# Patient Record
Sex: FEMALE | Race: WHITE | NOT HISPANIC OR LATINO | Employment: OTHER | URBAN - NONMETROPOLITAN AREA
[De-identification: names, ages, dates, MRNs, and addresses within clinical notes are randomized per-mention and may not be internally consistent; named-entity substitution may affect disease eponyms.]

---

## 2017-01-12 ENCOUNTER — HOSPITAL ENCOUNTER (OUTPATIENT)
Dept: ONCOLOGY | Facility: CLINIC | Age: 77
Discharge: HOME | End: 2017-01-12
Attending: INTERNAL MEDICINE | Admitting: INTERNAL MEDICINE

## 2017-11-30 ENCOUNTER — HOSPITAL ENCOUNTER (OUTPATIENT)
Dept: NUCLEAR MEDICINE | Facility: HOSPITAL | Age: 77
Discharge: HOME | End: 2017-11-30
Attending: FAMILY MEDICINE | Admitting: FAMILY MEDICINE

## 2018-07-11 ENCOUNTER — HOSPITAL ENCOUNTER (OUTPATIENT)
Dept: LAB | Facility: HOSPITAL | Age: 78
Setting detail: SPECIMEN
Discharge: HOME | End: 2018-07-11
Attending: INTERNAL MEDICINE | Admitting: INTERNAL MEDICINE

## 2018-07-12 LAB
ALBUMIN SERPL-MCNC: 4.2 G/DL (ref 3.5–4.8)
ALBUMIN/GLOB SERPL: 2 {RATIO} (ref 1–1.7)
ALP SERPL-CCNC: 76 IU/L (ref 32–91)
ALT SERPL-CCNC: 37 IU/L (ref 14–54)
ANION GAP SERPL CALC-SCNC: 11.3 MMOL/L (ref 10–20)
AST SERPL-CCNC: 42 IU/L (ref 15–41)
BILIRUB SERPL-MCNC: 0.3 MG/DL (ref 0.3–1.2)
BUN SERPL-MCNC: 7 MG/DL (ref 8–20)
BUN/CREAT SERPL: 8.8 (ref 5.4–26.2)
CALCIUM SERPL-MCNC: 8.7 MG/DL (ref 8.9–10.3)
CHLORIDE SERPL-SCNC: 102 MMOL/L (ref 101–111)
CONV CO2: 25 MMOL/L (ref 22–32)
CONV TOTAL PROTEIN: 6.3 G/DL (ref 6.1–7.9)
CREAT UR-MCNC: 0.8 MG/DL (ref 0.4–1)
GLOBULIN UR ELPH-MCNC: 2.1 G/DL (ref 2.5–3.8)
GLUCOSE SERPL-MCNC: 115 MG/DL (ref 65–99)
PHOSPHATE SERPL-MCNC: 3.7 MG/DL (ref 2.4–4.7)
POTASSIUM SERPL-SCNC: 4.3 MMOL/L (ref 3.6–5.1)
SODIUM SERPL-SCNC: 134 MMOL/L (ref 136–144)

## 2019-08-14 ENCOUNTER — TRANSCRIBE ORDERS (OUTPATIENT)
Dept: ADMINISTRATIVE | Facility: HOSPITAL | Age: 79
End: 2019-08-14

## 2019-08-14 ENCOUNTER — OFFICE (AMBULATORY)
Dept: URBAN - METROPOLITAN AREA PATHOLOGY 4 | Facility: PATHOLOGY | Age: 79
End: 2019-08-14

## 2019-08-14 ENCOUNTER — ON CAMPUS - OUTPATIENT (AMBULATORY)
Dept: URBAN - METROPOLITAN AREA HOSPITAL 2 | Facility: HOSPITAL | Age: 79
End: 2019-08-14

## 2019-08-14 ENCOUNTER — LAB REQUISITION (OUTPATIENT)
Dept: LAB | Facility: HOSPITAL | Age: 79
End: 2019-08-14

## 2019-08-14 VITALS
DIASTOLIC BLOOD PRESSURE: 89 MMHG | HEIGHT: 60 IN | DIASTOLIC BLOOD PRESSURE: 88 MMHG | HEART RATE: 80 BPM | RESPIRATION RATE: 15 BRPM | DIASTOLIC BLOOD PRESSURE: 72 MMHG | DIASTOLIC BLOOD PRESSURE: 99 MMHG | RESPIRATION RATE: 16 BRPM | DIASTOLIC BLOOD PRESSURE: 94 MMHG | DIASTOLIC BLOOD PRESSURE: 97 MMHG | SYSTOLIC BLOOD PRESSURE: 156 MMHG | SYSTOLIC BLOOD PRESSURE: 149 MMHG | HEART RATE: 72 BPM | SYSTOLIC BLOOD PRESSURE: 147 MMHG | HEART RATE: 75 BPM | HEART RATE: 70 BPM | DIASTOLIC BLOOD PRESSURE: 77 MMHG | OXYGEN SATURATION: 100 % | OXYGEN SATURATION: 95 % | SYSTOLIC BLOOD PRESSURE: 161 MMHG | SYSTOLIC BLOOD PRESSURE: 157 MMHG | DIASTOLIC BLOOD PRESSURE: 65 MMHG | DIASTOLIC BLOOD PRESSURE: 93 MMHG | OXYGEN SATURATION: 97 % | HEART RATE: 83 BPM | RESPIRATION RATE: 18 BRPM | DIASTOLIC BLOOD PRESSURE: 69 MMHG | HEART RATE: 76 BPM | HEART RATE: 79 BPM | SYSTOLIC BLOOD PRESSURE: 121 MMHG | SYSTOLIC BLOOD PRESSURE: 132 MMHG | HEART RATE: 85 BPM | TEMPERATURE: 97.6 F | OXYGEN SATURATION: 98 % | SYSTOLIC BLOOD PRESSURE: 119 MMHG | WEIGHT: 184 LBS | SYSTOLIC BLOOD PRESSURE: 112 MMHG | HEART RATE: 78 BPM | HEART RATE: 81 BPM | DIASTOLIC BLOOD PRESSURE: 78 MMHG | HEART RATE: 57 BPM | SYSTOLIC BLOOD PRESSURE: 144 MMHG

## 2019-08-14 DIAGNOSIS — C18.4 MALIGNANT NEOPLASM OF TRANSVERSE COLON: ICD-10-CM

## 2019-08-14 DIAGNOSIS — Z86.010 PERSONAL HISTORY OF COLONIC POLYPS: ICD-10-CM

## 2019-08-14 DIAGNOSIS — K56.600 PARTIAL INTESTINAL OBSTRUCTION, UNSPECIFIED AS TO CAUSE: ICD-10-CM

## 2019-08-14 DIAGNOSIS — K63.89 MASS OF COLON: Primary | ICD-10-CM

## 2019-08-14 DIAGNOSIS — D12.2 BENIGN NEOPLASM OF ASCENDING COLON: ICD-10-CM

## 2019-08-14 DIAGNOSIS — Z86.010 HISTORY OF COLONIC POLYPS: ICD-10-CM

## 2019-08-14 DIAGNOSIS — K57.30 DIVERTICULOSIS OF LARGE INTESTINE WITHOUT PERFORATION OR ABS: ICD-10-CM

## 2019-08-14 DIAGNOSIS — D12.0 BENIGN NEOPLASM OF CECUM: ICD-10-CM

## 2019-08-14 DIAGNOSIS — K64.0 FIRST DEGREE HEMORRHOIDS: ICD-10-CM

## 2019-08-14 DIAGNOSIS — Z98.890 OTHER SPECIFIED POSTPROCEDURAL STATES: ICD-10-CM

## 2019-08-14 DIAGNOSIS — D12.5 BENIGN NEOPLASM OF SIGMOID COLON: ICD-10-CM

## 2019-08-14 DIAGNOSIS — K22.2 ESOPHAGEAL OBSTRUCTION: ICD-10-CM

## 2019-08-14 DIAGNOSIS — K31.2 HOURGLASS STRICTURE AND STENOSIS OF STOMACH: ICD-10-CM

## 2019-08-14 DIAGNOSIS — K57.30 DIVERTICULOSIS OF LARGE INTESTINE WITHOUT PERFORATION OR ABSCESS WITHOUT BLEEDING: ICD-10-CM

## 2019-08-14 DIAGNOSIS — R13.10 DYSPHAGIA, UNSPECIFIED: ICD-10-CM

## 2019-08-14 DIAGNOSIS — R13.10 DYSPHAGIA: ICD-10-CM

## 2019-08-14 PROBLEM — K92.2 GASTROINTESTINAL HEMORRHAGE, UNSPECIFIED: Status: ACTIVE | Noted: 2019-08-14

## 2019-08-14 LAB
GI HISTOLOGY: A. SELECT: (no result)
GI HISTOLOGY: B. UNSPECIFIED: (no result)
GI HISTOLOGY: C. UNSPECIFIED: (no result)
GI HISTOLOGY: PDF REPORT: (no result)

## 2019-08-14 PROCEDURE — 88342 IMHCHEM/IMCYTCHM 1ST ANTB: CPT | Mod: 26 | Performed by: INTERNAL MEDICINE

## 2019-08-14 PROCEDURE — 88342 IMHCHEM/IMCYTCHM 1ST ANTB: CPT | Performed by: INTERNAL MEDICINE

## 2019-08-14 PROCEDURE — 43249 ESOPH EGD DILATION <30 MM: CPT | Performed by: INTERNAL MEDICINE

## 2019-08-14 PROCEDURE — 45385 COLONOSCOPY W/LESION REMOVAL: CPT | Mod: PT | Performed by: INTERNAL MEDICINE

## 2019-08-14 PROCEDURE — 45381 COLONOSCOPY SUBMUCOUS NJX: CPT | Mod: 59 | Performed by: INTERNAL MEDICINE

## 2019-08-14 PROCEDURE — 88342 IMHCHEM/IMCYTCHM 1ST ANTB: CPT

## 2019-08-14 PROCEDURE — 88305 TISSUE EXAM BY PATHOLOGIST: CPT | Performed by: INTERNAL MEDICINE

## 2019-08-14 PROCEDURE — 45380 COLONOSCOPY AND BIOPSY: CPT | Mod: 59,PT | Performed by: INTERNAL MEDICINE

## 2019-08-14 PROCEDURE — 88341 IMHCHEM/IMCYTCHM EA ADD ANTB: CPT

## 2019-08-14 PROCEDURE — 88341 IMHCHEM/IMCYTCHM EA ADD ANTB: CPT | Mod: 26 | Performed by: INTERNAL MEDICINE

## 2019-08-14 PROCEDURE — 88305 TISSUE EXAM BY PATHOLOGIST: CPT | Mod: 26 | Performed by: INTERNAL MEDICINE

## 2019-08-14 PROCEDURE — 43245 EGD DILATE STRICTURE: CPT | Performed by: INTERNAL MEDICINE

## 2019-08-19 ENCOUNTER — HOSPITAL ENCOUNTER (OUTPATIENT)
Dept: CT IMAGING | Facility: HOSPITAL | Age: 79
Discharge: HOME | End: 2019-08-19
Admitting: INTERNAL MEDICINE

## 2019-08-19 ENCOUNTER — HOSPITAL ENCOUNTER (OUTPATIENT)
Dept: CT IMAGING | Facility: HOSPITAL | Age: 79
Discharge: HOME | End: 2019-08-19

## 2019-08-19 DIAGNOSIS — K63.89 MASS OF COLON: ICD-10-CM

## 2019-08-19 LAB — CREAT BLDA-MCNC: 0.8 MG/DL (ref 0.6–1.3)

## 2019-08-19 PROCEDURE — 82565 ASSAY OF CREATININE: CPT

## 2019-08-19 PROCEDURE — 0 IOPAMIDOL PER 1 ML: Performed by: INTERNAL MEDICINE

## 2019-08-19 PROCEDURE — 74177 CT ABD & PELVIS W/CONTRAST: CPT

## 2019-08-19 PROCEDURE — 71260 CT THORAX DX C+: CPT

## 2019-08-19 RX ADMIN — IOPAMIDOL 100 ML: 755 INJECTION, SOLUTION INTRAVENOUS at 13:30

## 2019-08-20 ENCOUNTER — OFFICE VISIT (OUTPATIENT)
Dept: SURGERY | Facility: CLINIC | Age: 79
End: 2019-08-20

## 2019-08-20 VITALS
DIASTOLIC BLOOD PRESSURE: 93 MMHG | TEMPERATURE: 98.1 F | HEART RATE: 77 BPM | OXYGEN SATURATION: 96 % | SYSTOLIC BLOOD PRESSURE: 156 MMHG | WEIGHT: 189.8 LBS | HEIGHT: 60 IN | BODY MASS INDEX: 37.26 KG/M2

## 2019-08-20 DIAGNOSIS — C18.4 MALIGNANT NEOPLASM OF TRANSVERSE COLON (HCC): Primary | ICD-10-CM

## 2019-08-20 PROBLEM — IMO0002 DEGENERATION OF INTERVERTEBRAL DISC: Status: ACTIVE | Noted: 2019-08-20

## 2019-08-20 PROBLEM — M81.0 OSTEOPOROSIS: Status: ACTIVE | Noted: 2019-08-20

## 2019-08-20 PROBLEM — E03.9 HYPOTHYROIDISM: Status: ACTIVE | Noted: 2019-08-20

## 2019-08-20 PROBLEM — Z83.3 FAMILY HISTORY OF DIABETES MELLITUS: Status: ACTIVE | Noted: 2019-08-20

## 2019-08-20 PROBLEM — K21.9 GASTROESOPHAGEAL REFLUX DISEASE: Status: ACTIVE | Noted: 2019-08-20

## 2019-08-20 PROBLEM — F32.A DEPRESSION: Status: ACTIVE | Noted: 2019-08-20

## 2019-08-20 PROBLEM — Z80.0 FAMILY HISTORY OF MALIGNANT NEOPLASM OF COLON: Status: ACTIVE | Noted: 2019-08-20

## 2019-08-20 LAB
LAB AP CASE REPORT: NORMAL
LAB AP DIAGNOSIS COMMENT: NORMAL
PATH REPORT.ADDENDUM SPEC: NORMAL
PATH REPORT.FINAL DX SPEC: NORMAL
PATH REPORT.GROSS SPEC: NORMAL

## 2019-08-20 PROCEDURE — 99204 OFFICE O/P NEW MOD 45 MIN: CPT | Performed by: SURGERY

## 2019-08-20 RX ORDER — MECLIZINE HYDROCHLORIDE 25 MG/1
25 TABLET ORAL 3 TIMES DAILY PRN
COMMUNITY

## 2019-08-20 RX ORDER — ACETAMINOPHEN 500 MG
TABLET ORAL
COMMUNITY
Start: 2012-11-12

## 2019-08-20 RX ORDER — HYDROCODONE BITARTRATE AND ACETAMINOPHEN 10; 325 MG/1; MG/1
1 TABLET ORAL 4 TIMES DAILY
Refills: 0 | COMMUNITY
Start: 2019-08-03

## 2019-08-20 RX ORDER — HYDROXYCHLOROQUINE SULFATE 200 MG/1
TABLET, FILM COATED ORAL
Refills: 0 | COMMUNITY
Start: 2019-06-28

## 2019-08-20 RX ORDER — MEMANTINE HYDROCHLORIDE 10 MG/1
10 TABLET ORAL 2 TIMES DAILY
Refills: 3 | COMMUNITY
Start: 2019-06-23 | End: 2021-06-16 | Stop reason: SDUPTHER

## 2019-08-20 RX ORDER — RISPERIDONE 0.5 MG/1
0.05 TABLET ORAL DAILY
Refills: 0 | COMMUNITY
Start: 2019-06-26

## 2019-08-20 RX ORDER — ALPRAZOLAM 2 MG/1
2 TABLET ORAL 2 TIMES DAILY PRN
Refills: 0 | COMMUNITY
Start: 2019-08-09

## 2019-08-20 RX ORDER — CLOPIDOGREL BISULFATE 75 MG/1
75 TABLET ORAL DAILY
Refills: 0 | COMMUNITY
Start: 2019-08-01

## 2019-08-20 RX ORDER — CYANOCOBALAMIN 1000 UG/ML
INJECTION, SOLUTION INTRAMUSCULAR; SUBCUTANEOUS
COMMUNITY
Start: 2018-07-11 | End: 2021-06-16 | Stop reason: SDUPTHER

## 2019-08-20 RX ORDER — PANTOPRAZOLE SODIUM 40 MG/1
40 TABLET, DELAYED RELEASE ORAL DAILY
Refills: 0 | COMMUNITY
Start: 2019-08-01

## 2019-08-20 RX ORDER — LEVOTHYROXINE SODIUM 0.1 MG/1
100 TABLET ORAL DAILY
Refills: 0 | COMMUNITY
Start: 2019-08-01

## 2019-08-20 RX ORDER — LOSARTAN POTASSIUM 25 MG/1
25 TABLET ORAL DAILY
Refills: 0 | COMMUNITY
Start: 2019-06-07

## 2019-08-20 RX ORDER — DULOXETIN HYDROCHLORIDE 60 MG/1
30 CAPSULE, DELAYED RELEASE ORAL
Refills: 0 | COMMUNITY
Start: 2019-08-09 | End: 2020-01-22

## 2019-08-20 NOTE — PROGRESS NOTES
"Zaki Sierra is a 78 y.o. female.   Chief Complaint   Patient presents with   • Colon Cancer     /93 (BP Location: Left arm, Patient Position: Sitting, Cuff Size: Adult)   Pulse 77   Temp 98.1 °F (36.7 °C) (Oral)   Ht 152.4 cm (60\")   Wt 86.1 kg (189 lb 12.8 oz)   SpO2 96%   BMI 37.07 kg/m²     HISTORY OF PRESENT ILLNESS:  78-year-old lady who was referred to me today for evaluation for surgical resection for a newly diagnosed transverse colon cancer.  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  During her most recent colonoscopy she was found to have a partially obstructing transverse colon cancer of approximately 2 cm in size this was biopsied and the pathology did confirm invasive moderately differentiated adenocarcinoma with focal necrosis.  She underwent CT scanning of her chest abdomen and pelvis with no definitive evidence of metastatic disease.  She denies any new worsening issues of dysphasia or severe constipation or diarrhea even though she deals with all 3 of the symptoms.  She has mild to moderate upper abdominal swelling this is associated with some pain that gradually resolved spontaneously.  She has a history of multiple transient ischemic attacks and is currently managed on antiplatelet therapy.  Her Randolph-en-Y gastric bypass was approximately 40 years ago.  She has had multiple other abdominal surgeries including hysterectomy, cholecystectomy and thinks she may have had to have a partial colectomy at the time of her Randolph-en-Y gastric bypass due to a complication.  She endorses some blood per rectum with significant straining.  She does have a family history of colon cancer including and her brother who ultimately had to undergo surgery requiring stoma and " chemotherapy and says that she witnessed him have a significant worsening of his quality of life as soon as he started therapy for his colon cancer.  She is in the office today with her 2 daughters.       Outpatient Encounter Medications as of 8/20/2019   Medication Sig Dispense Refill   • ALPRAZolam (XANAX) 2 MG tablet Take 2 mg by mouth 2 (Two) Times a Day As Needed. for anxiety  0   • Calcium Carbonate-Vit D-Min (CALCIUM 1200) 1589-6755 MG-UNIT chewable tablet CALCIUM 1200 4285-0218 MG-UNIT CHEW     • clopidogrel (PLAVIX) 75 MG tablet Take 75 mg by mouth Daily.  0   • cyanocobalamin 1000 MCG/ML injection CYANOCOBALAMIN 1000 MCG/ML SOLN     • denosumab (PROLIA) 60 MG/ML solution prefilled syringe syringe PROLIA 60 MG/ML SOSY     • DULoxetine (CYMBALTA) 60 MG capsule Take 60 mg by mouth every night at bedtime.  0   • HYDROcodone-acetaminophen (NORCO)  MG per tablet Take 1 tablet by mouth 4 (Four) Times a Day.  0   • hydroxychloroquine (PLAQUENIL) 200 MG tablet TAKE ONE TABLET BY MOUTH EVERY DAY with food OR MILK  0   • levothyroxine (SYNTHROID, LEVOTHROID) 100 MCG tablet Take 100 mcg by mouth Daily.  0   • losartan (COZAAR) 25 MG tablet Take 25 mg by mouth Daily.  0   • meclizine (ANTIVERT) 25 MG tablet Take 25 mg by mouth 3 (Three) Times a Day As Needed for dizziness.     • memantine (NAMENDA) 10 MG tablet Take 10 mg by mouth 2 (Two) Times a Day.  3   • pantoprazole (PROTONIX) 40 MG EC tablet Take 40 mg by mouth Daily.  0   • risperiDONE (risperDAL) 0.5 MG tablet Take 0.5 mg by mouth Daily.  0     Facility-Administered Encounter Medications as of 8/20/2019   Medication Dose Route Frequency Provider Last Rate Last Dose   • [COMPLETED] iopamidol (ISOVUE-370) 76 % injection 100 mL  100 mL Intravenous Once in imaging Rupa Noland MD   100 mL at 08/19/19 1330         The following portions of the patient's history were reviewed and updated as appropriate: allergies, current medications, past family history,  past medical history, past social history, past surgical history and problem list.    Review of Systems  A complete review of system has been obtained is positive for chills depression dizziness fever forgetfulness headache loss of sleep nervousness numbness sweats musculoskeletal pain frequent urination bloating constipation diarrhea excessive thirst gas hemorrhoids indigestion nausea stomach pain vomiting chest pain high blood pressure poor circulation short of breath ankle swelling blurry vision difficulty swallowing dry mouth earache hoarseness loss of hearing persistent cough sinus problems vision changes easy bruising itching wounds that will not heal and hot flashes.  The remainder of the review of systems is negative    Objective   Physical Exam   Constitutional: She appears well-developed and well-nourished. No distress.   Eyes: EOM are normal. Pupils are equal, round, and reactive to light.   Neck: Neck supple. No tracheal deviation present.   Cardiovascular: Normal rate and regular rhythm.   Pulmonary/Chest: Effort normal. No respiratory distress.   Abdominal: Soft. She exhibits no mass. There is no tenderness.   Abdomen is obese with multiple surgical incisions including a midline laparotomy in both the upper and lower abdomen and a right subcostal incision.  There is no definitive evidence of hernia.  There is no chronic wounds on the abdomen.   Musculoskeletal: She exhibits edema. She exhibits no tenderness.   Neurological: She is alert. No cranial nerve deficit.   Skin: Skin is warm and dry. She is not diaphoretic.   Psychiatric: She has a normal mood and affect. Her behavior is normal.         Assessment/Plan   Willa was seen today for colon cancer.    Diagnoses and all orders for this visit:    Malignant neoplasm of transverse colon (CMS/HCC)  -     CEA; Future  -     Ambulatory Referral to Oncology    I have counseled the patient about her diagnosis of a 2 cm colon cancer with no evidence of local  invasion or metastatic disease on her CT scans.  I have talked to her about the risks and benefits of surgical removal of this transverse colon cancer including the fact that her Randolph-en-Y gastric bypass may have to be revised at the time of the operation since the limb does pass directly adjacent to the middle colic vasculature.  I counseled her about the expected recovery and since she does have some fair comorbidity including debilitating arthritis she has a lot of hesitancy about what her quality of life would be if she does not recover well.  She also has seen her brother go through treatment for colon cancer and says that she would rather be dead than live with a colostomy.  When I try to  her more about this she asked if I heard what she said that she meant it.  During our discussion it became clear that she did not want to pursue surgical therapy for management of her colon cancer.  She discussed this with her daughters today in the office in front of me who all were in agreement that they did not want surgery.  I then offered to refer her to oncology for discussion about whether or not she could have or would benefit from chemotherapy.  I also briefly mentioned that if no therapy is performed that this likely would progress but nobody would know how much time she would have.  I briefly talked about the possibility that she could develop a colon obstruction which at this location may be able to be stented but may not.  I have also told her that if that were to happen or if she were to get much sicker that she could consider hospice care.  After this discussion she was given an opportunity to ask any additional questions about surgical management of this problem she did not have any and was very comfortable with her decision.  At this point I will see her as needed for any future counseling or any surgical intervention that she may need and/or allow.    Man Tim MD  8/20/2019  9:50 AM

## 2019-08-23 ENCOUNTER — TELEPHONE (OUTPATIENT)
Dept: ONCOLOGY | Facility: CLINIC | Age: 79
End: 2019-08-23

## 2019-08-23 NOTE — TELEPHONE ENCOUNTER
Ms. Clark's daughter, Violeta, left message to see if appt time could be changed as have appt with surgeon at same time.  Returned call, rescheduled to 2:45.

## 2019-08-26 ENCOUNTER — APPOINTMENT (OUTPATIENT)
Dept: LAB | Facility: HOSPITAL | Age: 79
End: 2019-08-26

## 2019-08-26 ENCOUNTER — CONSULT (OUTPATIENT)
Dept: ONCOLOGY | Facility: CLINIC | Age: 79
End: 2019-08-26

## 2019-08-26 ENCOUNTER — RESULTS ENCOUNTER (OUTPATIENT)
Dept: ONCOLOGY | Facility: CLINIC | Age: 79
End: 2019-08-26

## 2019-08-26 VITALS
WEIGHT: 185.4 LBS | TEMPERATURE: 98 F | DIASTOLIC BLOOD PRESSURE: 90 MMHG | HEIGHT: 60 IN | HEART RATE: 79 BPM | SYSTOLIC BLOOD PRESSURE: 148 MMHG | BODY MASS INDEX: 36.4 KG/M2 | RESPIRATION RATE: 20 BRPM

## 2019-08-26 DIAGNOSIS — C18.4 MALIGNANT NEOPLASM OF TRANSVERSE COLON (HCC): ICD-10-CM

## 2019-08-26 DIAGNOSIS — C18.4 MALIGNANT NEOPLASM OF TRANSVERSE COLON (HCC): Primary | ICD-10-CM

## 2019-08-26 LAB
ALBUMIN SERPL-MCNC: 3.7 G/DL (ref 3.5–4.8)
ALBUMIN/GLOB SERPL: 1.6 G/DL (ref 1–1.7)
ALP SERPL-CCNC: 59 U/L (ref 32–91)
ALT SERPL W P-5'-P-CCNC: 14 U/L (ref 14–54)
ANION GAP SERPL CALCULATED.3IONS-SCNC: 14.9 MMOL/L (ref 5–15)
AST SERPL-CCNC: 19 U/L (ref 15–41)
BASOPHILS # BLD AUTO: 0.07 10*3/MM3 (ref 0–0.2)
BASOPHILS NFR BLD AUTO: 0.8 % (ref 0–1.5)
BILIRUB SERPL-MCNC: 0.3 MG/DL (ref 0.3–1.2)
BUN BLD-MCNC: 9 MG/DL (ref 8–20)
BUN/CREAT SERPL: 11.3 (ref 5.4–26.2)
CALCIUM SPEC-SCNC: 8.7 MG/DL (ref 8.9–10.3)
CHLORIDE SERPL-SCNC: 102 MMOL/L (ref 101–111)
CO2 SERPL-SCNC: 23 MMOL/L (ref 22–32)
CREAT BLD-MCNC: 0.8 MG/DL (ref 0.4–1)
DEPRECATED RDW RBC AUTO: 43.1 FL (ref 37–54)
EOSINOPHIL # BLD AUTO: 0.15 10*3/MM3 (ref 0–0.4)
EOSINOPHIL NFR BLD AUTO: 1.7 % (ref 0.3–6.2)
ERYTHROCYTE [DISTWIDTH] IN BLOOD BY AUTOMATED COUNT: 13.2 % (ref 12.3–15.4)
GFR SERPL CREATININE-BSD FRML MDRD: 70 ML/MIN/1.73
GLOBULIN UR ELPH-MCNC: 2.3 GM/DL (ref 2.5–3.8)
GLUCOSE BLD-MCNC: 153 MG/DL (ref 65–99)
HCT VFR BLD AUTO: 36.4 % (ref 34–46.6)
HGB BLD-MCNC: 11.7 G/DL (ref 12–15.9)
LYMPHOCYTES # BLD AUTO: 2.17 10*3/MM3 (ref 0.7–3.1)
LYMPHOCYTES NFR BLD AUTO: 24.5 % (ref 19.6–45.3)
MCH RBC QN AUTO: 29.6 PG (ref 26.6–33)
MCHC RBC AUTO-ENTMCNC: 32.1 G/DL (ref 31.5–35.7)
MCV RBC AUTO: 92.2 FL (ref 79–97)
MONOCYTES # BLD AUTO: 0.78 10*3/MM3 (ref 0.1–0.9)
MONOCYTES NFR BLD AUTO: 8.8 % (ref 5–12)
NEUTROPHILS # BLD AUTO: 5.68 10*3/MM3 (ref 1.7–7)
NEUTROPHILS NFR BLD AUTO: 64.2 % (ref 42.7–76)
PLATELET # BLD AUTO: 242 10*3/MM3 (ref 140–450)
PMV BLD AUTO: 10.8 FL (ref 6–12)
POTASSIUM BLD-SCNC: 4.9 MMOL/L (ref 3.6–5.1)
PROT SERPL-MCNC: 6 G/DL (ref 6.1–7.9)
RBC # BLD AUTO: 3.95 10*6/MM3 (ref 3.77–5.28)
SODIUM BLD-SCNC: 135 MMOL/L (ref 136–144)
WBC NRBC COR # BLD: 8.85 10*3/MM3 (ref 3.4–10.8)

## 2019-08-26 PROCEDURE — 99215 OFFICE O/P EST HI 40 MIN: CPT | Performed by: INTERNAL MEDICINE

## 2019-08-26 PROCEDURE — 85025 COMPLETE CBC W/AUTO DIFF WBC: CPT | Performed by: INTERNAL MEDICINE

## 2019-08-26 PROCEDURE — 36415 COLL VENOUS BLD VENIPUNCTURE: CPT | Performed by: INTERNAL MEDICINE

## 2019-08-26 PROCEDURE — 80053 COMPREHEN METABOLIC PANEL: CPT | Performed by: NURSE PRACTITIONER

## 2019-08-26 RX ORDER — LANOLIN ALCOHOL/MO/W.PET/CERES
100 CREAM (GRAM) TOPICAL DAILY
COMMUNITY

## 2019-08-26 RX ORDER — LANOLIN ALCOHOL/MO/W.PET/CERES
400 CREAM (GRAM) TOPICAL DAILY
COMMUNITY

## 2019-08-26 NOTE — PROGRESS NOTES
Hematology/Oncology Outpatient Consultation    Willa Sierra  1940    Primary Care Physician: Marilin Ernst MD  Referring Physician: Marilin Ernst,*  Reason For Consult: Adenocarcinoma of colon    History of Present Illness:  Mrs. Willa Sierra is status post gastric bypass surgery at age 38 and has chronic history of iron deficiency anemia requiring parenteral iron infusion and she is on monthly B12 injections.  Patient reports she undergoes regular screening colonoscopies this time it was prolonged by 2 years secondary to illness in the family and passing away of her .  · She had a colonoscopy done by Dr. Noland on 8/19/2019, it revealed polyps in the cecum and ascending colon which were benign.  There was a mass of 2 cm in size in the mid transverse colon.  A 4 mm polyp in the sigmoid colon which was benign.  There was mild diverticulosis of the descending colon and sigmoid colon.  Grade/stage I internal and external hemorrhoids.  · Pathology report was polyps in the cecum, sigmoid colon with tubular adenoma.  Transverse colon mass was invasive moderately differentiated adenocarcinoma with focal necrosis.   MLH1: Intact nuclear expression   MLH2: Intact nuclear expression   MSH6: Intact nuclear expression   PMS2: Intact nuclear expression  IHC interpretation-no loss of nuclear expression of MMR proteins: Low probability of microsatellite instability- high(MSI-H)  Patient was sent to the cancer care center for her newly diagnosed colon carcinoma and seen by me on 8/26/2019.    Patient was seen by me in the past for iron deficiency anemia and was given parenteral iron infusions in 2015.  · 8/14/2019 CT scan of the chest abdomen pelvis  1. No colonic mass lesion is identified on this CT examination. There is  no CT correlate to the patient's described transverse colonic mass, seen  on colonoscopy.  2. There is no evidence of metastatic disease within the chest, abdomen  or pelvis.  3.  Minimal patchy groundglass density in the right upper lobe favored to  represent benign mild pneumonitis or scarring.  4. Surgical changes of the EG junction stomach, in keeping with history  of gastric bypass.  5. Additional CT findings include: Cholecystectomy, presumed  hysterectomy, hepatic cyst, left renal cysts, chronic appearing mild  compression fractures of T1 and T2, mild coronary artery calcifications.       Past Medical History:   Diagnosis Date   • Anemia    • Arthritis    • Cataract    • Depression    • Osteoporosis    • Thyroid disease    • TIA (transient ischemic attack)        Past Surgical History:   Procedure Laterality Date   • CHOLECYSTECTOMY     • COLON RESECTION     • FACIAL COSMETIC SURGERY     • GASTRIC BYPASS     • HEMORRHOIDECTOMY     • HYSTERECTOMY           Current Outpatient Medications:   •  ALPRAZolam (XANAX) 2 MG tablet, Take 2 mg by mouth 2 (Two) Times a Day As Needed. for anxiety, Disp: , Rfl: 0  •  Calcium Carbonate-Vit D-Min (CALCIUM 1200) 8702-7164 MG-UNIT chewable tablet, CALCIUM 1200 0393-6264 MG-UNIT CHEW, Disp: , Rfl:   •  CBD (cannabidiol) oral oil, Take  by mouth As Needed., Disp: , Rfl:   •  clopidogrel (PLAVIX) 75 MG tablet, Take 75 mg by mouth Daily., Disp: , Rfl: 0  •  cyanocobalamin 1000 MCG/ML injection, CYANOCOBALAMIN 1000 MCG/ML SOLN, Disp: , Rfl:   •  denosumab (PROLIA) 60 MG/ML solution prefilled syringe syringe, PROLIA 60 MG/ML SOSY, Disp: , Rfl:   •  DULoxetine (CYMBALTA) 60 MG capsule, Take 60 mg by mouth every night at bedtime., Disp: , Rfl: 0  •  folic acid (FOLVITE) 400 MCG tablet, Take 400 mcg by mouth Daily., Disp: , Rfl:   •  HYDROcodone-acetaminophen (NORCO)  MG per tablet, Take 1 tablet by mouth 4 (Four) Times a Day., Disp: , Rfl: 0  •  hydroxychloroquine (PLAQUENIL) 200 MG tablet, TAKE ONE TABLET BY MOUTH EVERY DAY with food OR MILK, Disp: , Rfl: 0  •  levothyroxine (SYNTHROID, LEVOTHROID) 100 MCG tablet, Take 100 mcg by mouth Daily., Disp: ,  Rfl: 0  •  losartan (COZAAR) 25 MG tablet, Take 25 mg by mouth Daily., Disp: , Rfl: 0  •  Magnesium 100 MG capsule, Take 165 mg by mouth Daily., Disp: , Rfl:   •  meclizine (ANTIVERT) 25 MG tablet, Take 25 mg by mouth 3 (Three) Times a Day As Needed for dizziness., Disp: , Rfl:   •  memantine (NAMENDA) 10 MG tablet, Take 10 mg by mouth 2 (Two) Times a Day., Disp: , Rfl: 3  •  pantoprazole (PROTONIX) 40 MG EC tablet, Take 40 mg by mouth Daily., Disp: , Rfl: 0  •  risperiDONE (risperDAL) 0.5 MG tablet, Take 0.5 mg by mouth Daily., Disp: , Rfl: 0  •  vitamin B-6 (PYRIDOXINE) 50 MG tablet, Take 100 mg by mouth Daily., Disp: , Rfl:     No Known Allergies      There is no immunization history on file for this patient.    Family History   Problem Relation Age of Onset   • Heart disease Mother    • Diabetes Mother    • Heart disease Father    • Diabetes Father    • Stroke Father    • Heart disease Sister    • Liver disease Sister    • Cancer Brother    • Diabetes Brother    • Heart disease Brother        Cancer-related family history includes Cancer in her brother.    Social History     Tobacco Use   • Smoking status: Never Smoker   • Smokeless tobacco: Never Used   Substance Use Topics   • Alcohol use: No     Frequency: Never   • Drug use: No     Subjective: Patient is in my office accompanied by her family.  Reports to severe fatigue and tired which has been going on for about a year.  Her  passed away from suicide earlier this year.  He was very sick prior to that requiring 24 hours health care.  Patient denies any visible blood loss denies any constipation or diarrhea.  No fevers night sweats weight loss    ROS:   Review of Systems   Constitutional: Negative for fever.   HENT: Negative for nosebleeds and trouble swallowing.    Eyes: Negative for visual disturbance.   Respiratory: Negative for cough, shortness of breath and wheezing.    Cardiovascular: Negative for chest pain.   Gastrointestinal: Negative for  "abdominal pain and blood in stool.   Endocrine: Negative for cold intolerance.   Genitourinary: Negative for dysuria and hematuria.   Musculoskeletal: Negative for joint swelling.   Skin: Negative for rash.   Allergic/Immunologic: Negative for environmental allergies.   Neurological: Negative for seizures.   Hematological: Does not bruise/bleed easily.   Psychiatric/Behavioral: The patient is not nervous/anxious.        Objective:    Vitals:    08/26/19 1459   BP: 148/90   Pulse: 79   Resp: 20   Temp: 98 °F (36.7 °C)   Weight: 84.1 kg (185 lb 6.4 oz)   Height: 152.4 cm (60\")   PainSc: 0-No pain           Physical Exam:    Physical Exam   Constitutional: She is oriented to person, place, and time. No distress.   Moderately built obese   HENT:   Head: Normocephalic and atraumatic.   Eyes: Conjunctivae and EOM are normal. Right eye exhibits no discharge. Left eye exhibits no discharge. No scleral icterus.   Neck: Normal range of motion. Neck supple. No thyromegaly present.   Cardiovascular: Normal rate, regular rhythm and normal heart sounds. Exam reveals no gallop and no friction rub.   Pulmonary/Chest: Effort normal. No stridor. No respiratory distress. She has no wheezes.   Abdominal: Soft. Bowel sounds are normal. She exhibits no mass. There is no tenderness. There is no rebound and no guarding.   Musculoskeletal: Normal range of motion. She exhibits no tenderness.   Lymphadenopathy:     She has no cervical adenopathy.   Neurological: She is alert and oriented to person, place, and time. She exhibits normal muscle tone.   Skin: Skin is warm. No rash noted. She is not diaphoretic. No erythema.   Psychiatric: She has a normal mood and affect. Her behavior is normal.   Nursing note and vitals reviewed.      RECENT LABS  WBC   Date Value Ref Range Status   08/26/2019 8.85 3.40 - 10.80 10*3/mm3 Final     RBC   Date Value Ref Range Status   08/26/2019 3.95 3.77 - 5.28 10*6/mm3 Final     Hemoglobin   Date Value Ref Range " Status   08/26/2019 11.7 (L) 12.0 - 15.9 g/dL Final     Hematocrit   Date Value Ref Range Status   08/26/2019 36.4 34.0 - 46.6 % Final     MCV   Date Value Ref Range Status   08/26/2019 92.2 79.0 - 97.0 fL Final     MCH   Date Value Ref Range Status   08/26/2019 29.6 26.6 - 33.0 pg Final     MCHC   Date Value Ref Range Status   08/26/2019 32.1 31.5 - 35.7 g/dL Final     RDW   Date Value Ref Range Status   08/26/2019 13.2 12.3 - 15.4 % Final     RDW-SD   Date Value Ref Range Status   08/26/2019 43.1 37.0 - 54.0 fl Final     MPV   Date Value Ref Range Status   08/26/2019 10.8 6.0 - 12.0 fL Final     Platelets   Date Value Ref Range Status   08/26/2019 242 140 - 450 10*3/mm3 Final     Neutrophil %   Date Value Ref Range Status   08/26/2019 64.2 42.7 - 76.0 % Final     Lymphocyte %   Date Value Ref Range Status   08/26/2019 24.5 19.6 - 45.3 % Final     Monocyte %   Date Value Ref Range Status   08/26/2019 8.8 5.0 - 12.0 % Final     Eosinophil %   Date Value Ref Range Status   08/26/2019 1.7 0.3 - 6.2 % Final     Basophil %   Date Value Ref Range Status   08/26/2019 0.8 0.0 - 1.5 % Final     Neutrophils, Absolute   Date Value Ref Range Status   08/26/2019 5.68 1.70 - 7.00 10*3/mm3 Final     Lymphocytes, Absolute   Date Value Ref Range Status   08/26/2019 2.17 0.70 - 3.10 10*3/mm3 Final     Monocytes, Absolute   Date Value Ref Range Status   08/26/2019 0.78 0.10 - 0.90 10*3/mm3 Final     Eosinophils, Absolute   Date Value Ref Range Status   08/26/2019 0.15 0.00 - 0.40 10*3/mm3 Final     Basophils, Absolute   Date Value Ref Range Status   08/26/2019 0.07 0.00 - 0.20 10*3/mm3 Final       Lab Results   Component Value Date    GLUCOSE 115 (H) 07/11/2018    BUN 7 (L) 07/11/2018    CREATININE 0.80 08/19/2019    BCR 8.8 07/11/2018    K 4.3 07/11/2018    CO2 25 07/11/2018    CALCIUM 8.7 (L) 07/11/2018    ALBUMIN 4.2 07/11/2018    LABIL2 2.0 (H) 07/11/2018    AST 42 (H) 07/11/2018    ALT 37 07/11/2018          Assessment    1. Adenocarcinoma in the mid transverse colon  2. History of iron deficiency anemia and B12 deficiency  3. Status post gastric bypass surgery at age 38  4. Fatigue and tiredness  5. ECOG 1    Plan:    1. I reviewed the pathology report with the patient.  Patient has invasive adenocarcinoma the mass has to be surgically removed.  Patient saw Dr. Bowman today she was told the risk of colostomy is about 1 and thousand.  I encouraged the patient to undergo surgery given the invasive adenocarcinoma.  I answered all her and family's questions to their satisfaction.  Staging CT scans and serum CEA were normal.  No evidence of metastatic disease.  2. She has mild anemia normocytic and normochromic.  I will obtain iron studies.  Continue vitamin B12 replacements once every 4 weeks  3. She is status post gastric bypass surgery no clinical evidence of nutrient deficiency  4. Fatigue and tiredness could be related to physical and mental stressors that she went through in the last 2 years.  Now with a new diagnosis of colon cancer also.  I will check TSH level.  5. I will see her back in my office in 2 months time by the time she would have had her surgery      I have reviewed labs results, imaging, vitals, and medications with the patient and her family today.      Patient and her family verbalized understanding and is in agreement of the above plan.        This report was compiled using Dragon voice recognition software. I have made every effort to proof read this document; however, typographical errors may persist.

## 2019-10-30 ENCOUNTER — TREATMENT (OUTPATIENT)
Dept: ONCOLOGY | Facility: CLINIC | Age: 79
End: 2019-10-30

## 2019-10-31 ENCOUNTER — OFFICE VISIT (OUTPATIENT)
Dept: ONCOLOGY | Facility: CLINIC | Age: 79
End: 2019-10-31

## 2019-10-31 VITALS
SYSTOLIC BLOOD PRESSURE: 163 MMHG | BODY MASS INDEX: 35.93 KG/M2 | DIASTOLIC BLOOD PRESSURE: 86 MMHG | HEIGHT: 60 IN | HEART RATE: 67 BPM | WEIGHT: 183 LBS | RESPIRATION RATE: 20 BRPM | TEMPERATURE: 98.5 F

## 2019-10-31 DIAGNOSIS — D50.0 IRON DEFICIENCY ANEMIA DUE TO CHRONIC BLOOD LOSS: ICD-10-CM

## 2019-10-31 DIAGNOSIS — C18.4 MALIGNANT NEOPLASM OF TRANSVERSE COLON (HCC): Primary | ICD-10-CM

## 2019-10-31 LAB
ALBUMIN SERPL-MCNC: 4 G/DL (ref 3.5–5.2)
ALBUMIN/GLOB SERPL: 1.7 G/DL
ALP SERPL-CCNC: 77 U/L (ref 39–117)
ALT SERPL W P-5'-P-CCNC: 16 U/L (ref 1–33)
ANION GAP SERPL CALCULATED.3IONS-SCNC: 12 MMOL/L (ref 5–15)
AST SERPL-CCNC: 26 U/L (ref 1–32)
BASOPHILS # BLD AUTO: 0.1 10*3/MM3 (ref 0–0.2)
BASOPHILS NFR BLD AUTO: 1.1 % (ref 0–1.5)
BILIRUB SERPL-MCNC: 0.2 MG/DL (ref 0.2–1.2)
BUN BLD-MCNC: 11 MG/DL (ref 8–23)
BUN/CREAT SERPL: 12 (ref 7–25)
CALCIUM SPEC-SCNC: 9.1 MG/DL (ref 8.6–10.5)
CHLORIDE SERPL-SCNC: 97 MMOL/L (ref 98–107)
CO2 SERPL-SCNC: 27 MMOL/L (ref 22–29)
CREAT BLD-MCNC: 0.92 MG/DL (ref 0.57–1)
DEPRECATED RDW RBC AUTO: 41.1 FL (ref 37–54)
EOSINOPHIL # BLD AUTO: 0 10*3/MM3 (ref 0–0.4)
EOSINOPHIL NFR BLD AUTO: 0.2 % (ref 0.3–6.2)
ERYTHROCYTE [DISTWIDTH] IN BLOOD BY AUTOMATED COUNT: 13.6 % (ref 12.3–15.4)
FERRITIN SERPL-MCNC: 28.58 NG/ML (ref 13–150)
GFR SERPL CREATININE-BSD FRML MDRD: 59 ML/MIN/1.73
GLOBULIN UR ELPH-MCNC: 2.4 GM/DL
GLUCOSE BLD-MCNC: 186 MG/DL (ref 65–99)
HCT VFR BLD AUTO: 32.7 % (ref 34–46.6)
HGB BLD-MCNC: 10.6 G/DL (ref 12–15.9)
IRON 24H UR-MRATE: 47 MCG/DL (ref 37–145)
IRON SATN MFR SERPL: 10 % (ref 20–50)
LYMPHOCYTES # BLD AUTO: 1.2 10*3/MM3 (ref 0.7–3.1)
LYMPHOCYTES NFR BLD AUTO: 14 % (ref 19.6–45.3)
MCH RBC QN AUTO: 27.9 PG (ref 26.6–33)
MCHC RBC AUTO-ENTMCNC: 32.5 G/DL (ref 31.5–35.7)
MCV RBC AUTO: 85.7 FL (ref 79–97)
MONOCYTES # BLD AUTO: 0.3 10*3/MM3 (ref 0.1–0.9)
MONOCYTES NFR BLD AUTO: 3.2 % (ref 5–12)
NEUTROPHILS # BLD AUTO: 6.7 10*3/MM3 (ref 1.7–7)
NEUTROPHILS NFR BLD AUTO: 81.5 % (ref 42.7–76)
NRBC BLD AUTO-RTO: 0 /100 WBC (ref 0–0.2)
PLATELET # BLD AUTO: 217 10*3/MM3 (ref 140–450)
PMV BLD AUTO: 10 FL (ref 6–12)
POTASSIUM BLD-SCNC: 4.7 MMOL/L (ref 3.5–5.2)
PROT SERPL-MCNC: 6.4 G/DL (ref 6–8.5)
RBC # BLD AUTO: 3.81 10*6/MM3 (ref 3.77–5.28)
SODIUM BLD-SCNC: 136 MMOL/L (ref 136–145)
TIBC SERPL-MCNC: 472 MCG/DL (ref 298–536)
TRANSFERRIN SERPL-MCNC: 317 MG/DL (ref 200–360)
WBC NRBC COR # BLD: 8.2 10*3/MM3 (ref 3.4–10.8)

## 2019-10-31 PROCEDURE — 82378 CARCINOEMBRYONIC ANTIGEN: CPT | Performed by: INTERNAL MEDICINE

## 2019-10-31 PROCEDURE — 82728 ASSAY OF FERRITIN: CPT | Performed by: INTERNAL MEDICINE

## 2019-10-31 PROCEDURE — 84466 ASSAY OF TRANSFERRIN: CPT | Performed by: INTERNAL MEDICINE

## 2019-10-31 PROCEDURE — 99215 OFFICE O/P EST HI 40 MIN: CPT | Performed by: INTERNAL MEDICINE

## 2019-10-31 PROCEDURE — 83540 ASSAY OF IRON: CPT | Performed by: INTERNAL MEDICINE

## 2019-10-31 PROCEDURE — 82607 VITAMIN B-12: CPT | Performed by: INTERNAL MEDICINE

## 2019-10-31 PROCEDURE — 80053 COMPREHEN METABOLIC PANEL: CPT | Performed by: INTERNAL MEDICINE

## 2019-10-31 PROCEDURE — 85025 COMPLETE CBC W/AUTO DIFF WBC: CPT | Performed by: INTERNAL MEDICINE

## 2019-10-31 PROCEDURE — 36415 COLL VENOUS BLD VENIPUNCTURE: CPT | Performed by: INTERNAL MEDICINE

## 2019-10-31 RX ORDER — HYDROXYZINE HYDROCHLORIDE 25 MG/1
25 TABLET, FILM COATED ORAL EVERY 6 HOURS PRN
Refills: 1 | COMMUNITY
Start: 2019-10-15

## 2019-10-31 RX ORDER — BUMETANIDE 0.5 MG/1
TABLET ORAL
COMMUNITY

## 2019-10-31 RX ORDER — HYDRALAZINE HYDROCHLORIDE 25 MG/1
TABLET, FILM COATED ORAL EVERY 12 HOURS SCHEDULED
COMMUNITY

## 2019-10-31 RX ORDER — SORBITOL SOLUTION 70 %
SOLUTION, ORAL MISCELLANEOUS
Refills: 0 | COMMUNITY
Start: 2019-09-30

## 2019-10-31 RX ORDER — METRONIDAZOLE 500 MG/1
TABLET ORAL
COMMUNITY

## 2019-10-31 RX ORDER — PREDNISONE 20 MG/1
TABLET ORAL
Refills: 0 | COMMUNITY
Start: 2019-10-22 | End: 2020-10-29

## 2019-10-31 RX ORDER — ONDANSETRON 4 MG/1
TABLET, FILM COATED ORAL
COMMUNITY
End: 2021-06-16

## 2019-10-31 RX ORDER — CLOPIDOGREL BISULFATE 75 MG/1
1 TABLET ORAL
COMMUNITY
End: 2020-01-22 | Stop reason: SDUPTHER

## 2019-10-31 RX ORDER — INDOMETHACIN 50 MG/1
50 CAPSULE ORAL AS NEEDED
COMMUNITY

## 2019-10-31 RX ORDER — TOBRAMYCIN AND DEXAMETHASONE 3; 1 MG/ML; MG/ML
SUSPENSION/ DROPS OPHTHALMIC
COMMUNITY
End: 2021-06-16

## 2019-10-31 NOTE — PROGRESS NOTES
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 Willa Sierra is status post gastric bypass surgery at age 38 and has chronic history of iron deficiency anemia requiring parenteral iron infusion and she is on monthly B12 injections.  Patient reports she undergoes regular screening colonoscopies this time it was prolonged by 2 years secondary to illness in the family and passing away of her .  · She had a colonoscopy done by Dr. Noland on 8/19/2019, it revealed polyps in the cecum and ascending colon which were benign.  There was a mass of 2 cm in size in the mid transverse colon.  A 4 mm polyp in the sigmoid colon which was benign.  There was mild diverticulosis of the descending colon and sigmoid colon.  Grade/stage I internal and external hemorrhoids.  · Pathology report was polyps in the cecum, sigmoid colon with tubular adenoma.  Transverse colon mass was invasive moderately differentiated adenocarcinoma with focal necrosis. MLH1: Intact nuclear expression MLH2: Intact nuclear expression  MSH6: Intact nuclear expression  PMS2: Intact nuclear expression  · IHC interpretation-no loss of nuclear expression of MMR proteins: Low probability of microsatellite instability- high(MSI-H)  · Patient was sent to the cancer care center for her newly diagnosed colon carcinoma and seen by me on 8/26/2019.    · Patient was seen by me in the past for iron deficiency anemia and was given parenteral iron infusions in 2015.  · 8/14/2019 CT scan of the chest abdomen pelvis, 1. No colonic mass lesion is identified on this CT examination. There is no CT correlate to the patient's described transverse colonic mass, seen on colonoscopy. 2. There is no evidence of metastatic  disease within the chest, abdomen or pelvis., 3. Minimal patchy groundglass density in the right upper lobe favored to represent benign mild pneumonitis or scarring. 4. Surgical changes of the EG junction stomach, in keeping with history of gastric bypass. 5. Additional CT findings include: Cholecystectomy, presumed hysterectomy, hepatic cyst, left renal cysts, chronic appearing mild compression fractures of T1 and T2, mild coronary artery calcifications.  · 10/1/2019 Segmental resection of transverse colon pathology report transverse colon tumor size 4 cm grade 1 all margins negative, 15 lymph nodes were extracted 0+.  PT 3 N0.  Surgery was done by Dr. Bowman  Past Medical History:   Diagnosis Date   • Anemia    • Arthritis    • Cataract    • Depression    • Osteoporosis    • Thyroid disease    • TIA (transient ischemic attack)        Past Surgical History:   Procedure Laterality Date   • CHOLECYSTECTOMY     • COLON RESECTION     • FACIAL COSMETIC SURGERY     • GASTRIC BYPASS     • HEMORRHOIDECTOMY     • HYSTERECTOMY           Current Outpatient Medications:   •  ALPRAZolam (XANAX) 2 MG tablet, Take 2 mg by mouth 2 (Two) Times a Day As Needed. for anxiety, Disp: , Rfl: 0  •  bumetanide (BUMEX) 0.5 MG tablet, bumetanide 0.5 mg tablet, Disp: , Rfl:   •  Calcium Carbonate-Vit D-Min (CALCIUM 1200) 5874-7751 MG-UNIT chewable tablet, CALCIUM 1200 3370-3507 MG-UNIT CHEW, Disp: , Rfl:   •  CBD (cannabidiol) oral oil, Take  by mouth As Needed., Disp: , Rfl:   •  clopidogrel (PLAVIX) 75 MG tablet, Take 75 mg by mouth Daily., Disp: , Rfl: 0  •  Cyanocobalamin (B-12) 1000 MCG/ML kit, 1 mL., Disp: , Rfl:   •  denosumab (PROLIA) 60 MG/ML solution prefilled syringe syringe, PROLIA 60 MG/ML SOSY, Disp: , Rfl:   •  DULoxetine (CYMBALTA) 60 MG capsule, Take 30 mg by mouth every night at bedtime., Disp: , Rfl: 0  •  folic acid (FOLVITE) 400 MCG tablet, Take 400 mcg by mouth Daily., Disp: , Rfl:   •  HYDROcodone-acetaminophen  (NORCO)  MG per tablet, Take 1 tablet by mouth 4 (Four) Times a Day., Disp: , Rfl: 0  •  hydroxychloroquine (PLAQUENIL) 200 MG tablet, TAKE ONE TABLET BY MOUTH EVERY DAY with food OR MILK, Disp: , Rfl: 0  •  hydrOXYzine (ATARAX) 25 MG tablet, Take 25 mg by mouth Every 6 (Six) Hours As Needed. for anxiety, Disp: , Rfl: 1  •  indomethacin (INDOCIN) 50 MG capsule, Take 50 mg by mouth As Needed (MIgraine)., Disp: , Rfl:   •  levothyroxine (SYNTHROID, LEVOTHROID) 100 MCG tablet, Take 100 mcg by mouth Daily., Disp: , Rfl: 0  •  losartan (COZAAR) 25 MG tablet, Take 25 mg by mouth Daily., Disp: , Rfl: 0  •  memantine (NAMENDA) 10 MG tablet, Take 10 mg by mouth 2 (Two) Times a Day., Disp: , Rfl: 3  •  pantoprazole (PROTONIX) 40 MG EC tablet, Take 40 mg by mouth Daily., Disp: , Rfl: 0  •  risperiDONE (risperDAL) 0.5 MG tablet, Take 0.05 mg by mouth Daily., Disp: , Rfl: 0  •  vitamin B-6 (PYRIDOXINE) 50 MG tablet, Take 100 mg by mouth Daily., Disp: , Rfl:   •  clopidogrel (PLAVIX) 75 MG tablet, Take 1 tablet by mouth., Disp: , Rfl:   •  cyanocobalamin 1000 MCG/ML injection, CYANOCOBALAMIN 1000 MCG/ML SOLN, Disp: , Rfl:   •  hydrALAZINE (APRESOLINE) 25 MG tablet, Every 12 (Twelve) Hours., Disp: , Rfl:   •  Magnesium 100 MG capsule, Take 165 mg by mouth Daily., Disp: , Rfl:   •  meclizine (ANTIVERT) 25 MG tablet, Take 25 mg by mouth 3 (Three) Times a Day As Needed for dizziness., Disp: , Rfl:   •  metroNIDAZOLE (FLAGYL) 500 MG tablet, metronidazole 500 mg tablet, Disp: , Rfl:   •  ondansetron (ZOFRAN) 4 MG tablet, ondansetron HCl 4 mg tablet, Disp: , Rfl:   •  predniSONE (DELTASONE) 20 MG tablet, TAKE ONE TABLET BY MOUTH EVERY DAY FOR 10 DAYS, Disp: , Rfl: 0  •  sorbitol 70 % solution solution, TAKE 50ML TWO TIMES AS DIRECTED PER WRITTEN INSTRUCTIONS, Disp: , Rfl: 0  •  tobramycin-dexamethasone (TOBRADEX) 0.3-0.1 % ophthalmic suspension, tobramycin 0.3 %-dexamethasone 0.1 % eye drops,suspension, Disp: , Rfl:     No Known  "Allergies    Family History   Problem Relation Age of Onset   • Heart disease Mother    • Diabetes Mother    • Heart disease Father    • Diabetes Father    • Stroke Father    • Heart disease Sister    • Liver disease Sister    • Cancer Brother    • Diabetes Brother    • Heart disease Brother        Cancer-related family history includes Cancer in her brother.    Social History     Tobacco Use   • Smoking status: Never Smoker   • Smokeless tobacco: Never Used   Substance Use Topics   • Alcohol use: No     Frequency: Never   • Drug use: No       I have reviewed the history of present illness, past medical history, family history, social history, lab results, all notes and other records since the patient was last seen on   8/26/2019  SUBJECTIVE:      Patient is my office for follow-up.  Patient recovered well from surgery.  He lost some weight but she is getting back again.  Denies any further visible blood loss.    ROS:      Review of Systems   Constitutional: Negative for fever.   HENT: Negative for nosebleeds and trouble swallowing.    Eyes: Negative for visual disturbance.   Respiratory: Negative for cough, shortness of breath and wheezing.    Cardiovascular: Negative for chest pain.   Gastrointestinal: Negative for abdominal pain and blood in stool.   Endocrine: Negative for cold intolerance.   Genitourinary: Negative for dysuria and hematuria.   Musculoskeletal: Negative for joint swelling.   Skin: Negative for rash.   Allergic/Immunologic: Negative for environmental allergies.   Neurological: Negative for seizures.   Hematological: Does not bruise/bleed easily.   Psychiatric/Behavioral: The patient is not nervous/anxious.          Objective:       Vitals:    10/31/19 1026   BP: 163/86   Pulse: 67   Resp: 20   Temp: 98.5 °F (36.9 °C)   Weight: 83 kg (183 lb)   Height: 152.4 cm (60\")         PHYSICAL EXAM:      Physical Exam   Constitutional: She is oriented to person, place, and time. No distress.   HENT:   Head: " Normocephalic and atraumatic.   Eyes: Conjunctivae and EOM are normal. Right eye exhibits no discharge. Left eye exhibits no discharge. No scleral icterus.   Neck: Normal range of motion. Neck supple. No thyromegaly present.   Cardiovascular: Normal rate, regular rhythm and normal heart sounds. Exam reveals no gallop and no friction rub.   Pulmonary/Chest: Effort normal. No stridor. No respiratory distress. She has no wheezes.   Abdominal: Soft. Bowel sounds are normal. She exhibits no mass. There is no tenderness. There is no rebound and no guarding.   Musculoskeletal: Normal range of motion. She exhibits no tenderness.   Lymphadenopathy:     She has no cervical adenopathy.   Neurological: She is alert and oriented to person, place, and time. She exhibits normal muscle tone.   Skin: Skin is warm. No rash noted. She is not diaphoretic. No erythema.   Psychiatric: She has a normal mood and affect. Her behavior is normal.   Nursing note and vitals reviewed.       RECENT LABS:     WBC   Date Value Ref Range Status   08/26/2019 8.85 3.40 - 10.80 10*3/mm3 Final     RBC   Date Value Ref Range Status   08/26/2019 3.95 3.77 - 5.28 10*6/mm3 Final     Hemoglobin   Date Value Ref Range Status   08/26/2019 11.7 (L) 12.0 - 15.9 g/dL Final     Hematocrit   Date Value Ref Range Status   08/26/2019 36.4 34.0 - 46.6 % Final     MCV   Date Value Ref Range Status   08/26/2019 92.2 79.0 - 97.0 fL Final     MCH   Date Value Ref Range Status   08/26/2019 29.6 26.6 - 33.0 pg Final     MCHC   Date Value Ref Range Status   08/26/2019 32.1 31.5 - 35.7 g/dL Final     RDW   Date Value Ref Range Status   08/26/2019 13.2 12.3 - 15.4 % Final     RDW-SD   Date Value Ref Range Status   08/26/2019 43.1 37.0 - 54.0 fl Final     MPV   Date Value Ref Range Status   08/26/2019 10.8 6.0 - 12.0 fL Final     Platelets   Date Value Ref Range Status   08/26/2019 242 140 - 450 10*3/mm3 Final     Neutrophil %   Date Value Ref Range Status   08/26/2019 64.2  42.7 - 76.0 % Final     Lymphocyte %   Date Value Ref Range Status   08/26/2019 24.5 19.6 - 45.3 % Final     Monocyte %   Date Value Ref Range Status   08/26/2019 8.8 5.0 - 12.0 % Final     Eosinophil %   Date Value Ref Range Status   08/26/2019 1.7 0.3 - 6.2 % Final     Basophil %   Date Value Ref Range Status   08/26/2019 0.8 0.0 - 1.5 % Final     Neutrophils, Absolute   Date Value Ref Range Status   08/26/2019 5.68 1.70 - 7.00 10*3/mm3 Final     Lymphocytes, Absolute   Date Value Ref Range Status   08/26/2019 2.17 0.70 - 3.10 10*3/mm3 Final     Monocytes, Absolute   Date Value Ref Range Status   08/26/2019 0.78 0.10 - 0.90 10*3/mm3 Final     Eosinophils, Absolute   Date Value Ref Range Status   08/26/2019 0.15 0.00 - 0.40 10*3/mm3 Final     Basophils, Absolute   Date Value Ref Range Status   08/26/2019 0.07 0.00 - 0.20 10*3/mm3 Final       Lab Results   Component Value Date    GLUCOSE 153 (H) 08/26/2019    BUN 9 08/26/2019    CREATININE 0.80 08/26/2019    EGFRIFNONA 70 08/26/2019    BCR 11.3 08/26/2019    K 4.9 08/26/2019    CO2 23.0 08/26/2019    CALCIUM 8.7 (L) 08/26/2019    ALBUMIN 3.70 08/26/2019    LABIL2 2.0 (H) 07/11/2018    AST 19 08/26/2019    ALT 14 08/26/2019         Assessment/Plan      ASSESSMENT:     1. 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 Chronic iron deficiency anemia  3. Vitamin B12 deficiency status post gastric bypass surgery at age 38  4. ECOG 1    PLAN:      1. Reviewed the pathology report with the patient and her daughter.  Patient will not benefit from adjuvant chemotherapy.  2. Check CBC including hemoglobin and MCV today.  If MCV is low will give her intravenous iron.  3. Continue monthly vitamin B12 injection.  4. Check CMP and CEA today  5. I will see her back in my office in 2 months with prior CT scan of the chest abdomen pelvis for staging purposes    I have reviewed labs results, imaging, vitals, and medications with the patient today.      Patient  verbalized understanding and is in agreement of the above plan.          This report was compiled using Dragon voice recognition software. I have made every effort to proof read this document; however, typographical errors may persist.

## 2019-11-01 LAB
CEA SERPL-MCNC: 4.47 NG/ML
VIT B12 BLD-MCNC: 1282 PG/ML (ref 211–946)

## 2019-12-03 ENCOUNTER — HOSPITAL ENCOUNTER (OUTPATIENT)
Dept: PET IMAGING | Facility: HOSPITAL | Age: 79
Discharge: HOME | End: 2019-12-03
Admitting: INTERNAL MEDICINE

## 2019-12-03 DIAGNOSIS — C18.4 MALIGNANT NEOPLASM OF TRANSVERSE COLON (HCC): ICD-10-CM

## 2019-12-03 LAB — CREAT BLDA-MCNC: 0.7 MG/DL (ref 0.6–1.3)

## 2019-12-03 PROCEDURE — 71260 CT THORAX DX C+: CPT

## 2019-12-03 PROCEDURE — 82565 ASSAY OF CREATININE: CPT

## 2019-12-03 PROCEDURE — 74177 CT ABD & PELVIS W/CONTRAST: CPT

## 2019-12-03 PROCEDURE — 0 IOPAMIDOL PER 1 ML: Performed by: INTERNAL MEDICINE

## 2019-12-03 RX ADMIN — IOPAMIDOL 100 ML: 755 INJECTION, SOLUTION INTRAVENOUS at 10:10

## 2019-12-11 RX ORDER — DULOXETIN HYDROCHLORIDE 30 MG/1
30 CAPSULE, DELAYED RELEASE ORAL DAILY
Refills: 2 | COMMUNITY
Start: 2019-11-12

## 2019-12-12 ENCOUNTER — OFFICE VISIT (OUTPATIENT)
Dept: ONCOLOGY | Facility: CLINIC | Age: 79
End: 2019-12-12

## 2019-12-12 VITALS
SYSTOLIC BLOOD PRESSURE: 150 MMHG | WEIGHT: 180 LBS | HEIGHT: 60 IN | DIASTOLIC BLOOD PRESSURE: 87 MMHG | BODY MASS INDEX: 35.34 KG/M2 | HEART RATE: 74 BPM

## 2019-12-12 DIAGNOSIS — F51.01 PRIMARY INSOMNIA: Primary | ICD-10-CM

## 2019-12-12 DIAGNOSIS — C18.4 MALIGNANT NEOPLASM OF TRANSVERSE COLON (HCC): ICD-10-CM

## 2019-12-12 DIAGNOSIS — D50.0 IRON DEFICIENCY ANEMIA DUE TO CHRONIC BLOOD LOSS: ICD-10-CM

## 2019-12-12 LAB
BASOPHILS # BLD AUTO: 0.1 10*3/MM3 (ref 0–0.2)
BASOPHILS NFR BLD AUTO: 2.2 % (ref 0–1.5)
DEPRECATED RDW RBC AUTO: 45.9 FL (ref 37–54)
EOSINOPHIL # BLD AUTO: 0.2 10*3/MM3 (ref 0–0.4)
EOSINOPHIL NFR BLD AUTO: 4.2 % (ref 0.3–6.2)
ERYTHROCYTE [DISTWIDTH] IN BLOOD BY AUTOMATED COUNT: 15.4 % (ref 12.3–15.4)
HCT VFR BLD AUTO: 34.1 % (ref 34–46.6)
HGB BLD-MCNC: 11.2 G/DL (ref 12–15.9)
LYMPHOCYTES # BLD AUTO: 2.5 10*3/MM3 (ref 0.7–3.1)
LYMPHOCYTES NFR BLD AUTO: 46.4 % (ref 19.6–45.3)
MCH RBC QN AUTO: 27.5 PG (ref 26.6–33)
MCHC RBC AUTO-ENTMCNC: 32.7 G/DL (ref 31.5–35.7)
MCV RBC AUTO: 84.1 FL (ref 79–97)
MONOCYTES # BLD AUTO: 0.5 10*3/MM3 (ref 0.1–0.9)
MONOCYTES NFR BLD AUTO: 9.5 % (ref 5–12)
NEUTROPHILS # BLD AUTO: 2 10*3/MM3 (ref 1.7–7)
NEUTROPHILS NFR BLD AUTO: 37.7 % (ref 42.7–76)
NRBC BLD AUTO-RTO: 0 /100 WBC (ref 0–0.2)
PLATELET # BLD AUTO: 249 10*3/MM3 (ref 140–450)
PMV BLD AUTO: 9.1 FL (ref 6–12)
RBC # BLD AUTO: 4.06 10*6/MM3 (ref 3.77–5.28)
RETICS # AUTO: 0.06 10*6/MM3 (ref 0.02–0.13)
RETICS/RBC NFR AUTO: 1.37 % (ref 0.7–1.9)
WBC NRBC COR # BLD: 5.4 10*3/MM3 (ref 3.4–10.8)

## 2019-12-12 PROCEDURE — 99215 OFFICE O/P EST HI 40 MIN: CPT | Performed by: INTERNAL MEDICINE

## 2019-12-12 PROCEDURE — 36415 COLL VENOUS BLD VENIPUNCTURE: CPT | Performed by: INTERNAL MEDICINE

## 2019-12-12 PROCEDURE — 85025 COMPLETE CBC W/AUTO DIFF WBC: CPT | Performed by: NURSE PRACTITIONER

## 2019-12-12 PROCEDURE — 85045 AUTOMATED RETICULOCYTE COUNT: CPT | Performed by: NURSE PRACTITIONER

## 2019-12-12 RX ORDER — TEMAZEPAM 7.5 MG/1
15 CAPSULE ORAL NIGHTLY PRN
Qty: 30 CAPSULE | Refills: 2 | Status: SHIPPED | OUTPATIENT
Start: 2019-12-12 | End: 2020-01-12

## 2019-12-12 NOTE — PROGRESS NOTES
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 Willa Sierra is status post gastric bypass surgery at age 38 and has chronic history of iron deficiency anemia requiring parenteral iron infusion and she is on monthly B12 injections.  Patient reports she undergoes regular screening colonoscopies this time it was prolonged by 2 years secondary to illness in the family and passing away of her .  · She had a colonoscopy done by Dr. Noland on 8/19/2019, it revealed polyps in the cecum and ascending colon which were benign.  There was a mass of 2 cm in size in the mid transverse colon.  A 4 mm polyp in the sigmoid colon which was benign.  There was mild diverticulosis of the descending colon and sigmoid colon.  Grade/stage I internal and external hemorrhoids.  · Pathology report was polyps in the cecum, sigmoid colon with tubular adenoma.  Transverse colon mass was invasive moderately differentiated adenocarcinoma with focal necrosis. MLH1: Intact nuclear expression MLH2: Intact nuclear expression  MSH6: Intact nuclear expression  PMS2: Intact nuclear expression  · IHC interpretation-no loss of nuclear expression of MMR proteins: Low probability of microsatellite instability- high(MSI-H)  · Patient was sent to the cancer care center for her newly diagnosed colon carcinoma and seen by me on 8/26/2019.    · Patient was seen by me in the past for iron deficiency anemia and was given parenteral iron infusions in 2015.  · 8/14/2019 CT scan of the chest abdomen pelvis, 1. No colonic mass lesion is identified on this CT examination. There is no CT correlate to the patient's described transverse colonic mass, seen on colonoscopy. 2. There is no evidence of metastatic  disease within the chest, abdomen or pelvis., 3. Minimal patchy groundglass density in the right upper lobe favored to represent benign mild pneumonitis or scarring. 4. Surgical changes of the EG junction stomach, in keeping with history of gastric bypass. 5. Additional CT findings include: Cholecystectomy, presumed hysterectomy, hepatic cyst, left renal cysts, chronic appearing mild compression fractures of T1 and T2, mild coronary artery calcifications.  · 10/1/2019 Segmental resection of transverse colon pathology report transverse colon tumor size 4 cm grade 1 all margins negative, 15 lymph nodes were extracted 0+.  PT 3 N0.  Surgery was done by Dr. Bowman  · 12/3/2019 CT scan of the chest with contrast - 1. No evidence of metastatic disease to the chest 2. No active cardiopulmonary disease 3. Mild coronary artery calcification 4. No acute chest or upper abdominal finding 5. No change from 08/19/2019 . CT scan of abdomen and pelvis 1. No acute abdominal or pelvic finding, no evidence of metastatic disease or primary tumor 2. Prior changes of colon resection are seen on today's study which are new from previous exam 3. Changes of gastric bypass and hysterectomy and cholecystectomy     Past Medical History:   Diagnosis Date   • Anemia    • Arthritis    • Cataract    • Depression    • Osteoporosis    • Thyroid disease    • TIA (transient ischemic attack)        Past Surgical History:   Procedure Laterality Date   • CHOLECYSTECTOMY     • COLON RESECTION     • FACIAL COSMETIC SURGERY     • GASTRIC BYPASS     • HEMORRHOIDECTOMY     • HYSTERECTOMY           Current Outpatient Medications:   •  ALPRAZolam (XANAX) 2 MG tablet, Take 2 mg by mouth 2 (Two) Times a Day As Needed. for anxiety, Disp: , Rfl: 0  •  bumetanide (BUMEX) 0.5 MG tablet, bumetanide 0.5 mg tablet, Disp: , Rfl:   •  Calcium Carbonate-Vit D-Min (CALCIUM 1200) 0811-9164 MG-UNIT chewable tablet, CALCIUM 1200 5469-7314 MG-UNIT CHEW, Disp: , Rfl:   •  CBD  (cannabidiol) oral oil, Take  by mouth As Needed., Disp: , Rfl:   •  clopidogrel (PLAVIX) 75 MG tablet, Take 75 mg by mouth Daily., Disp: , Rfl: 0  •  clopidogrel (PLAVIX) 75 MG tablet, Take 1 tablet by mouth., Disp: , Rfl:   •  Cyanocobalamin (B-12) 1000 MCG/ML kit, 1 mL., Disp: , Rfl:   •  cyanocobalamin 1000 MCG/ML injection, CYANOCOBALAMIN 1000 MCG/ML SOLN, Disp: , Rfl:   •  denosumab (PROLIA) 60 MG/ML solution prefilled syringe syringe, PROLIA 60 MG/ML SOSY, Disp: , Rfl:   •  DULoxetine (CYMBALTA) 30 MG capsule, Take 30 mg by mouth Daily., Disp: , Rfl: 2  •  DULoxetine (CYMBALTA) 60 MG capsule, Take 30 mg by mouth every night at bedtime., Disp: , Rfl: 0  •  folic acid (FOLVITE) 400 MCG tablet, Take 400 mcg by mouth Daily., Disp: , Rfl:   •  hydrALAZINE (APRESOLINE) 25 MG tablet, Every 12 (Twelve) Hours., Disp: , Rfl:   •  HYDROcodone-acetaminophen (NORCO)  MG per tablet, Take 1 tablet by mouth 4 (Four) Times a Day., Disp: , Rfl: 0  •  hydroxychloroquine (PLAQUENIL) 200 MG tablet, TAKE ONE TABLET BY MOUTH EVERY DAY with food OR MILK, Disp: , Rfl: 0  •  hydrOXYzine (ATARAX) 25 MG tablet, Take 25 mg by mouth Every 6 (Six) Hours As Needed. for anxiety, Disp: , Rfl: 1  •  indomethacin (INDOCIN) 50 MG capsule, Take 50 mg by mouth As Needed (MIgraine)., Disp: , Rfl:   •  levothyroxine (SYNTHROID, LEVOTHROID) 100 MCG tablet, Take 100 mcg by mouth Daily., Disp: , Rfl: 0  •  losartan (COZAAR) 25 MG tablet, Take 25 mg by mouth Daily., Disp: , Rfl: 0  •  Magnesium 100 MG capsule, Take 165 mg by mouth Daily., Disp: , Rfl:   •  meclizine (ANTIVERT) 25 MG tablet, Take 25 mg by mouth 3 (Three) Times a Day As Needed for dizziness., Disp: , Rfl:   •  memantine (NAMENDA) 10 MG tablet, Take 10 mg by mouth 2 (Two) Times a Day., Disp: , Rfl: 3  •  ondansetron (ZOFRAN) 4 MG tablet, ondansetron HCl 4 mg tablet, Disp: , Rfl:   •  pantoprazole (PROTONIX) 40 MG EC tablet, Take 40 mg by mouth Daily., Disp: , Rfl: 0  •  vitamin B-6  (PYRIDOXINE) 50 MG tablet, Take 100 mg by mouth Daily., Disp: , Rfl:   •  metroNIDAZOLE (FLAGYL) 500 MG tablet, metronidazole 500 mg tablet, Disp: , Rfl:   •  predniSONE (DELTASONE) 20 MG tablet, TAKE ONE TABLET BY MOUTH EVERY DAY FOR 10 DAYS, Disp: , Rfl: 0  •  risperiDONE (risperDAL) 0.5 MG tablet, Take 0.05 mg by mouth Daily., Disp: , Rfl: 0  •  sorbitol 70 % solution solution, TAKE 50ML TWO TIMES AS DIRECTED PER WRITTEN INSTRUCTIONS, Disp: , Rfl: 0  •  tobramycin-dexamethasone (TOBRADEX) 0.3-0.1 % ophthalmic suspension, tobramycin 0.3 %-dexamethasone 0.1 % eye drops,suspension, Disp: , Rfl:     No Known Allergies    Family History   Problem Relation Age of Onset   • Heart disease Mother    • Diabetes Mother    • Heart disease Father    • Diabetes Father    • Stroke Father    • Heart disease Sister    • Liver disease Sister    • Cancer Brother    • Diabetes Brother    • Heart disease Brother        Cancer-related family history includes Cancer in her brother.    Social History     Tobacco Use   • Smoking status: Never Smoker   • Smokeless tobacco: Never Used   Substance Use Topics   • Alcohol use: No     Frequency: Never   • Drug use: No       I have reviewed the history of present illness, past medical history, family history, social history, lab results, all notes and other records since the patient was last seen on   8/26/2019  SUBJECTIVE:      Patient is my office for follow-up of lung cancer and anemia..  Patient recovered well from surgery.  He lost some weight but she is getting back again.  Denies any further visible blood loss.  Reports unable to sleep well at night.  Takes naps during the daytime.    ROS:      Review of Systems   Constitutional: Negative for fever.   HENT: Negative for nosebleeds and trouble swallowing.    Eyes: Negative for visual disturbance.   Respiratory: Negative for cough, shortness of breath and wheezing.    Cardiovascular: Negative for chest pain.   Gastrointestinal: Negative  "for abdominal pain and blood in stool.   Endocrine: Negative for cold intolerance.   Genitourinary: Negative for dysuria and hematuria.   Musculoskeletal: Negative for joint swelling.   Skin: Negative for rash.   Allergic/Immunologic: Negative for environmental allergies.   Neurological: Negative for seizures.   Hematological: Does not bruise/bleed easily.   Psychiatric/Behavioral: The patient is not nervous/anxious.          Objective:       Vitals:    12/12/19 0900   BP: 150/87   Pulse: 74   Weight: 81.6 kg (180 lb)   Height: 152.4 cm (60\")         PHYSICAL EXAM:      Physical Exam   Constitutional: She is oriented to person, place, and time. No distress.   HENT:   Head: Normocephalic and atraumatic.   Eyes: Conjunctivae and EOM are normal. Right eye exhibits no discharge. Left eye exhibits no discharge. No scleral icterus.   Neck: Normal range of motion. Neck supple. No thyromegaly present.   Cardiovascular: Normal rate, regular rhythm and normal heart sounds. Exam reveals no gallop and no friction rub.   Pulmonary/Chest: Effort normal. No stridor. No respiratory distress. She has no wheezes.   Abdominal: Soft. Bowel sounds are normal. She exhibits no mass. There is no tenderness. There is no rebound and no guarding.   Well-healed scars   Musculoskeletal: Normal range of motion. She exhibits no tenderness.   Lymphadenopathy:     She has no cervical adenopathy.   Neurological: She is alert and oriented to person, place, and time. She exhibits normal muscle tone.   Skin: Skin is warm. No rash noted. She is not diaphoretic. No erythema.   Psychiatric: She has a normal mood and affect. Her behavior is normal.   Nursing note and vitals reviewed.       RECENT LABS:     WBC   Date Value Ref Range Status   10/31/2019 8.20 3.40 - 10.80 10*3/mm3 Final     RBC   Date Value Ref Range Status   10/31/2019 3.81 3.77 - 5.28 10*6/mm3 Final     Hemoglobin   Date Value Ref Range Status   10/31/2019 10.6 (L) 12.0 - 15.9 g/dL Final "     Hematocrit   Date Value Ref Range Status   10/31/2019 32.7 (L) 34.0 - 46.6 % Final     MCV   Date Value Ref Range Status   10/31/2019 85.7 79.0 - 97.0 fL Final     MCH   Date Value Ref Range Status   10/31/2019 27.9 26.6 - 33.0 pg Final     MCHC   Date Value Ref Range Status   10/31/2019 32.5 31.5 - 35.7 g/dL Final     RDW   Date Value Ref Range Status   10/31/2019 13.6 12.3 - 15.4 % Final     RDW-SD   Date Value Ref Range Status   10/31/2019 41.1 37.0 - 54.0 fl Final     MPV   Date Value Ref Range Status   10/31/2019 10.0 6.0 - 12.0 fL Final     Platelets   Date Value Ref Range Status   10/31/2019 217 140 - 450 10*3/mm3 Final     Neutrophil %   Date Value Ref Range Status   10/31/2019 81.5 (H) 42.7 - 76.0 % Final     Lymphocyte %   Date Value Ref Range Status   10/31/2019 14.0 (L) 19.6 - 45.3 % Final     Monocyte %   Date Value Ref Range Status   10/31/2019 3.2 (L) 5.0 - 12.0 % Final     Eosinophil %   Date Value Ref Range Status   10/31/2019 0.2 (L) 0.3 - 6.2 % Final     Basophil %   Date Value Ref Range Status   10/31/2019 1.1 0.0 - 1.5 % Final     Neutrophils, Absolute   Date Value Ref Range Status   10/31/2019 6.70 1.70 - 7.00 10*3/mm3 Final     Lymphocytes, Absolute   Date Value Ref Range Status   10/31/2019 1.20 0.70 - 3.10 10*3/mm3 Final     Monocytes, Absolute   Date Value Ref Range Status   10/31/2019 0.30 0.10 - 0.90 10*3/mm3 Final     Eosinophils, Absolute   Date Value Ref Range Status   10/31/2019 0.00 0.00 - 0.40 10*3/mm3 Final     Basophils, Absolute   Date Value Ref Range Status   10/31/2019 0.10 0.00 - 0.20 10*3/mm3 Final     nRBC   Date Value Ref Range Status   10/31/2019 0.0 0.0 - 0.2 /100 WBC Final       Lab Results   Component Value Date    GLUCOSE 186 (H) 10/31/2019    BUN 11 10/31/2019    CREATININE 0.70 12/03/2019    EGFRIFNONA 59 (L) 10/31/2019    BCR 12.0 10/31/2019    K 4.7 10/31/2019    CO2 27.0 10/31/2019    CALCIUM 9.1 10/31/2019    ALBUMIN 4.00 10/31/2019    LABIL2 2.0 (H)  07/11/2018    AST 26 10/31/2019    ALT 16 10/31/2019         Assessment/Plan      ASSESSMENT:     1. 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 Chronic iron deficiency anemia  3. Vitamin B12 deficiency status post gastric bypass surgery at age 38  4. Insomnia  5. ECOG 1    PLAN:      1. I discussed CT scan results with the patient and her daughter.  At this point she is disease-free.  2. Patient just undergo one-year colonoscopy in July 2020  3. Check CBC including hemoglobin and MCV today.  If MCV is low will give her intravenous iron.  4. Continue monthly vitamin B12 injection.  Iron studies  5. Check CMP and CEA today  6. Restoril 15 mg prescription was given for her insomnia  7. I will see her back in my office in 6 months     I have reviewed labs results, imaging, vitals, and medications with the patient today.      Patient verbalized understanding and is in agreement of the above plan.          This report was compiled using Dragon voice recognition software. I have made every effort to proof read this document; however, typographical errors may persist.

## 2019-12-13 DIAGNOSIS — D50.8 IRON DEFICIENCY ANEMIA SECONDARY TO INADEQUATE DIETARY IRON INTAKE: ICD-10-CM

## 2019-12-13 LAB
ALBUMIN SERPL-MCNC: 4 G/DL (ref 3.5–4.8)
ALBUMIN/GLOB SERPL: 2.2 {RATIO} (ref 1.2–2.2)
ALP SERPL-CCNC: 100 IU/L (ref 39–117)
ALT SERPL-CCNC: 18 IU/L (ref 0–32)
AST SERPL-CCNC: 29 IU/L (ref 0–40)
BILIRUB SERPL-MCNC: 0.2 MG/DL (ref 0–1.2)
BUN SERPL-MCNC: 11 MG/DL (ref 8–27)
BUN/CREAT SERPL: 15 (ref 12–28)
CALCIUM SERPL-MCNC: 8.5 MG/DL (ref 8.7–10.3)
CEA SERPL-MCNC: 5.7 NG/ML (ref 0–4.7)
CHLORIDE SERPL-SCNC: 105 MMOL/L (ref 96–106)
CO2 SERPL-SCNC: 23 MMOL/L (ref 20–29)
CREAT SERPL-MCNC: 0.75 MG/DL (ref 0.57–1)
ETHNIC BACKGROUND STATED: 23.5 MIU/ML (ref 2.6–18.5)
FERRITIN SERPL-MCNC: 13 NG/ML (ref 15–150)
FOLATE SERPL-MCNC: >20 NG/ML
GLOBULIN SER CALC-MCNC: 1.8 G/DL (ref 1.5–4.5)
GLUCOSE SERPL-MCNC: 158 MG/DL (ref 65–99)
HAPTOGLOB SERPL-MCNC: 149 MG/DL (ref 34–200)
IRON SATN MFR SERPL: 18 % (ref 15–55)
IRON SERPL-MCNC: 68 UG/DL (ref 27–139)
LOPINAVIR ISLT PHENOTYP: 319 UG/DL (ref 118–369)
POTASSIUM SERPL-SCNC: 5.1 MMOL/L (ref 3.5–5.2)
PROT SERPL-MCNC: 5.8 G/DL (ref 6–8.5)
SODIUM SERPL-SCNC: 143 MMOL/L (ref 134–144)
TIBC SERPL-MCNC: 387 UG/DL (ref 250–450)
VIT B12 SERPL-MCNC: 927 PG/ML (ref 232–1245)

## 2019-12-13 RX ORDER — SODIUM CHLORIDE 9 MG/ML
250 INJECTION, SOLUTION INTRAVENOUS ONCE
Status: CANCELLED | OUTPATIENT
Start: 2020-01-15

## 2019-12-13 RX ORDER — SODIUM CHLORIDE 9 MG/ML
250 INJECTION, SOLUTION INTRAVENOUS ONCE
Status: CANCELLED | OUTPATIENT
Start: 2020-01-22

## 2020-01-08 ENCOUNTER — TELEPHONE (OUTPATIENT)
Dept: ONCOLOGY | Facility: CLINIC | Age: 80
End: 2020-01-08

## 2020-01-15 ENCOUNTER — HOSPITAL ENCOUNTER (OUTPATIENT)
Dept: ONCOLOGY | Facility: HOSPITAL | Age: 80
Setting detail: INFUSION SERIES
End: 2020-01-15

## 2020-01-15 ENCOUNTER — HOSPITAL ENCOUNTER (OUTPATIENT)
Dept: ONCOLOGY | Facility: HOSPITAL | Age: 80
Setting detail: INFUSION SERIES
Discharge: HOME | End: 2020-01-15

## 2020-01-15 VITALS
HEIGHT: 60 IN | HEART RATE: 64 BPM | RESPIRATION RATE: 18 BRPM | DIASTOLIC BLOOD PRESSURE: 91 MMHG | BODY MASS INDEX: 36.91 KG/M2 | SYSTOLIC BLOOD PRESSURE: 156 MMHG | TEMPERATURE: 97.7 F | WEIGHT: 188 LBS

## 2020-01-15 DIAGNOSIS — D50.8 IRON DEFICIENCY ANEMIA SECONDARY TO INADEQUATE DIETARY IRON INTAKE: Primary | ICD-10-CM

## 2020-01-15 PROCEDURE — 25010000002 FERRIC CARBOXYMALTOSE 750 MG/15ML SOLUTION 15 ML VIAL: Performed by: INTERNAL MEDICINE

## 2020-01-15 PROCEDURE — 96365 THER/PROPH/DIAG IV INF INIT: CPT | Performed by: INTERNAL MEDICINE

## 2020-01-15 RX ORDER — TEMAZEPAM 15 MG/1
15 CAPSULE ORAL NIGHTLY PRN
COMMUNITY
Start: 2020-01-13 | End: 2021-06-16

## 2020-01-15 RX ADMIN — SODIUM CHLORIDE 750 MG: 900 INJECTION, SOLUTION INTRAVENOUS at 13:19

## 2020-01-22 ENCOUNTER — HOSPITAL ENCOUNTER (OUTPATIENT)
Dept: ONCOLOGY | Facility: HOSPITAL | Age: 80
Setting detail: INFUSION SERIES
Discharge: HOME | End: 2020-01-22

## 2020-01-22 VITALS
TEMPERATURE: 98.2 F | HEIGHT: 60 IN | BODY MASS INDEX: 36.3 KG/M2 | HEART RATE: 98 BPM | RESPIRATION RATE: 18 BRPM | DIASTOLIC BLOOD PRESSURE: 84 MMHG | WEIGHT: 184.9 LBS | SYSTOLIC BLOOD PRESSURE: 133 MMHG

## 2020-01-22 DIAGNOSIS — D50.8 IRON DEFICIENCY ANEMIA SECONDARY TO INADEQUATE DIETARY IRON INTAKE: Primary | ICD-10-CM

## 2020-01-22 PROCEDURE — 96365 THER/PROPH/DIAG IV INF INIT: CPT | Performed by: INTERNAL MEDICINE

## 2020-01-22 PROCEDURE — 25010000002 FERRIC CARBOXYMALTOSE 750 MG/15ML SOLUTION 15 ML VIAL: Performed by: INTERNAL MEDICINE

## 2020-01-22 RX ORDER — SODIUM CHLORIDE 9 MG/ML
250 INJECTION, SOLUTION INTRAVENOUS ONCE
Status: DISCONTINUED | OUTPATIENT
Start: 2020-01-22 | End: 2020-01-23 | Stop reason: HOSPADM

## 2020-01-22 RX ADMIN — SODIUM CHLORIDE 750 MG: 900 INJECTION, SOLUTION INTRAVENOUS at 13:34

## 2020-05-22 ENCOUNTER — OFFICE (AMBULATORY)
Dept: URBAN - METROPOLITAN AREA CLINIC 64 | Facility: CLINIC | Age: 80
End: 2020-05-22

## 2020-05-22 VITALS
HEART RATE: 82 BPM | WEIGHT: 195 LBS | HEIGHT: 60 IN | SYSTOLIC BLOOD PRESSURE: 117 MMHG | DIASTOLIC BLOOD PRESSURE: 81 MMHG

## 2020-05-22 DIAGNOSIS — R11.2 NAUSEA WITH VOMITING, UNSPECIFIED: ICD-10-CM

## 2020-05-22 DIAGNOSIS — D50.9 IRON DEFICIENCY ANEMIA, UNSPECIFIED: ICD-10-CM

## 2020-05-22 DIAGNOSIS — R14.0 ABDOMINAL DISTENSION (GASEOUS): ICD-10-CM

## 2020-05-22 DIAGNOSIS — R10.9 UNSPECIFIED ABDOMINAL PAIN: ICD-10-CM

## 2020-05-22 PROCEDURE — 99214 OFFICE O/P EST MOD 30 MIN: CPT | Performed by: NURSE PRACTITIONER

## 2020-05-22 RX ORDER — RIFAXIMIN 550 MG/1
1650 TABLET ORAL
Qty: 56 | Refills: 1 | Status: COMPLETED
Start: 2020-05-22 | End: 2020-06-18

## 2020-06-11 ENCOUNTER — OFFICE VISIT (OUTPATIENT)
Dept: ONCOLOGY | Facility: CLINIC | Age: 80
End: 2020-06-11

## 2020-06-11 VITALS
DIASTOLIC BLOOD PRESSURE: 96 MMHG | HEART RATE: 74 BPM | TEMPERATURE: 97.5 F | SYSTOLIC BLOOD PRESSURE: 160 MMHG | HEIGHT: 60 IN | WEIGHT: 193 LBS | BODY MASS INDEX: 37.89 KG/M2

## 2020-06-11 DIAGNOSIS — C18.4 MALIGNANT NEOPLASM OF TRANSVERSE COLON (HCC): ICD-10-CM

## 2020-06-11 DIAGNOSIS — D50.8 IRON DEFICIENCY ANEMIA SECONDARY TO INADEQUATE DIETARY IRON INTAKE: Primary | ICD-10-CM

## 2020-06-11 PROCEDURE — 36415 COLL VENOUS BLD VENIPUNCTURE: CPT | Performed by: INTERNAL MEDICINE

## 2020-06-11 PROCEDURE — 99214 OFFICE O/P EST MOD 30 MIN: CPT | Performed by: INTERNAL MEDICINE

## 2020-06-11 RX ORDER — METOCLOPRAMIDE 10 MG/1
TABLET ORAL
COMMUNITY
Start: 2020-06-04

## 2020-06-11 RX ORDER — MIRTAZAPINE 15 MG/1
15 TABLET, FILM COATED ORAL
COMMUNITY
Start: 2020-03-30 | End: 2021-06-16

## 2020-06-11 RX ORDER — POTASSIUM CHLORIDE 750 MG/1
TABLET, FILM COATED, EXTENDED RELEASE ORAL
COMMUNITY
Start: 2020-05-09

## 2020-06-11 NOTE — PROGRESS NOTES
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 Willa Sierra is status post gastric bypass surgery at age 38 and has chronic history of iron deficiency anemia requiring parenteral iron infusion and she is on monthly B12 injections.  Patient reports she undergoes regular screening colonoscopies this time it was prolonged by 2 years secondary to illness in the family and passing away of her .  · She had a colonoscopy done by Dr. Noland on 8/19/2019, it revealed polyps in the cecum and ascending colon which were benign.  There was a mass of 2 cm in size in the mid transverse colon.  A 4 mm polyp in the sigmoid colon which was benign.  There was mild diverticulosis of the descending colon and sigmoid colon.  Grade/stage I internal and external hemorrhoids.  · Pathology report was polyps in the cecum, sigmoid colon with tubular adenoma.  Transverse colon mass was invasive moderately differentiated adenocarcinoma with focal necrosis. MLH1: Intact nuclear expression MLH2: Intact nuclear expression  MSH6: Intact nuclear expression  PMS2: Intact nuclear expression  · IHC interpretation-no loss of nuclear expression of MMR proteins: Low probability of microsatellite instability- high(MSI-H)  · Patient was sent to the cancer care center for her newly diagnosed colon carcinoma and seen by me on 8/26/2019.    · Patient was seen by me in the past for iron deficiency anemia and was given parenteral iron infusions in 2015.  · 8/14/2019 CT scan of the chest abdomen pelvis, 1. No colonic mass lesion is identified on this CT examination. There is no CT correlate to the patient's described transverse colonic mass, seen on colonoscopy. 2. There is no evidence of metastatic  disease within the chest, abdomen or pelvis., 3. Minimal patchy groundglass density in the right upper lobe favored to represent benign mild pneumonitis or scarring. 4. Surgical changes of the EG junction stomach, in keeping with history of gastric bypass. 5. Additional CT findings include: Cholecystectomy, presumed hysterectomy, hepatic cyst, left renal cysts, chronic appearing mild compression fractures of T1 and T2, mild coronary artery calcifications.  · 10/1/2019 Segmental resection of transverse colon pathology report transverse colon tumor size 4 cm grade 1 all margins negative, 15 lymph nodes were extracted 0+.  PT 3 N0.  Surgery was done by Dr. Bowman  · 12/3/2019 CT scan of the chest with contrast - 1. No evidence of metastatic disease to the chest 2. No active cardiopulmonary disease 3. Mild coronary artery calcification 4. No acute chest or upper abdominal finding 5. No change from 08/19/2019 . CT scan of abdomen and pelvis 1. No acute abdominal or pelvic finding, no evidence of metastatic disease or primary tumor 2. Prior changes of colon resection are seen on today's study which are new from previous exam 3. Changes of gastric bypass and hysterectomy and cholecystectomy    · January 2020 patient received 2 dose of Injectafer infusions for iron deficiency anemia.  · Patient was put on Remeron for insomnia and anxiety patient gained 10 pounds after that and she took herself off of it.    Past Medical History:   Diagnosis Date   • Anemia    • Arthritis    • Cataract    • Depression    • Osteoporosis    • Thyroid disease    • TIA (transient ischemic attack)        Past Surgical History:   Procedure Laterality Date   • CHOLECYSTECTOMY     • COLON RESECTION     • FACIAL COSMETIC SURGERY     • GASTRIC BYPASS     • HEMORRHOIDECTOMY     • HYSTERECTOMY           Current Outpatient Medications:   •  ALPRAZolam (XANAX) 2 MG tablet, Take 2 mg by mouth 2 (Two) Times a Day As Needed. for anxiety, Disp: , Rfl: 0  •   bumetanide (BUMEX) 0.5 MG tablet, bumetanide 0.5 mg tablet, Disp: , Rfl:   •  Calcium Carbonate-Vit D-Min (CALCIUM 1200) 9261-7667 MG-UNIT chewable tablet, CALCIUM 1200 3600-9192 MG-UNIT CHEW, Disp: , Rfl:   •  CBD (cannabidiol) oral oil, Take  by mouth As Needed., Disp: , Rfl:   •  clopidogrel (PLAVIX) 75 MG tablet, Take 75 mg by mouth Daily., Disp: , Rfl: 0  •  Cyanocobalamin (B-12) 1000 MCG/ML kit, 1 mL., Disp: , Rfl:   •  cyanocobalamin 1000 MCG/ML injection, CYANOCOBALAMIN 1000 MCG/ML SOLN, Disp: , Rfl:   •  denosumab (PROLIA) 60 MG/ML solution prefilled syringe syringe, PROLIA 60 MG/ML SOSY, Disp: , Rfl:   •  DULoxetine (CYMBALTA) 30 MG capsule, Take 30 mg by mouth Daily., Disp: , Rfl: 2  •  folic acid (FOLVITE) 400 MCG tablet, Take 400 mcg by mouth Daily., Disp: , Rfl:   •  hydrALAZINE (APRESOLINE) 25 MG tablet, Every 12 (Twelve) Hours., Disp: , Rfl:   •  HYDROcodone-acetaminophen (NORCO)  MG per tablet, Take 1 tablet by mouth 4 (Four) Times a Day., Disp: , Rfl: 0  •  hydroxychloroquine (PLAQUENIL) 200 MG tablet, TAKE ONE TABLET BY MOUTH EVERY DAY with food OR MILK, Disp: , Rfl: 0  •  hydrOXYzine (ATARAX) 25 MG tablet, Take 25 mg by mouth Every 6 (Six) Hours As Needed. for anxiety, Disp: , Rfl: 1  •  indomethacin (INDOCIN) 50 MG capsule, Take 50 mg by mouth As Needed (MIgraine)., Disp: , Rfl:   •  levothyroxine (SYNTHROID, LEVOTHROID) 100 MCG tablet, Take 100 mcg by mouth Daily., Disp: , Rfl: 0  •  losartan (COZAAR) 25 MG tablet, Take 25 mg by mouth Daily., Disp: , Rfl: 0  •  Magnesium 100 MG capsule, Take 165 mg by mouth Daily., Disp: , Rfl:   •  meclizine (ANTIVERT) 25 MG tablet, Take 25 mg by mouth 3 (Three) Times a Day As Needed for dizziness., Disp: , Rfl:   •  memantine (NAMENDA) 10 MG tablet, Take 10 mg by mouth 2 (Two) Times a Day., Disp: , Rfl: 3  •  metoclopramide (REGLAN) 10 MG tablet, Take 1 tablet by mouth three times a day before meals for 30 days, Disp: , Rfl:   •  metroNIDAZOLE (FLAGYL)  500 MG tablet, metronidazole 500 mg tablet, Disp: , Rfl:   •  ondansetron (ZOFRAN) 4 MG tablet, ondansetron HCl 4 mg tablet, Disp: , Rfl:   •  pantoprazole (PROTONIX) 40 MG EC tablet, Take 40 mg by mouth Daily., Disp: , Rfl: 0  •  potassium chloride (K-DUR) 10 MEQ CR tablet, 1 TABLET WITH FOOD ONCE A DAY PRN ORALLY 15, Disp: , Rfl:   •  predniSONE (DELTASONE) 20 MG tablet, TAKE ONE TABLET BY MOUTH EVERY DAY FOR 10 DAYS, Disp: , Rfl: 0  •  risperiDONE (risperDAL) 0.5 MG tablet, Take 0.05 mg by mouth Daily., Disp: , Rfl: 0  •  sorbitol 70 % solution solution, TAKE 50ML TWO TIMES AS DIRECTED PER WRITTEN INSTRUCTIONS, Disp: , Rfl: 0  •  temazepam (RESTORIL) 15 MG capsule, Take 15 mg by mouth At Night As Needed., Disp: , Rfl:   •  tobramycin-dexamethasone (TOBRADEX) 0.3-0.1 % ophthalmic suspension, tobramycin 0.3 %-dexamethasone 0.1 % eye drops,suspension, Disp: , Rfl:   •  vitamin B-6 (PYRIDOXINE) 50 MG tablet, Take 100 mg by mouth Daily., Disp: , Rfl:   •  mirtazapine (REMERON) 15 MG tablet, Take 15 mg by mouth every night at bedtime., Disp: , Rfl:     No Known Allergies    Family History   Problem Relation Age of Onset   • Heart disease Mother    • Diabetes Mother    • Heart disease Father    • Diabetes Father    • Stroke Father    • Heart disease Sister    • Liver disease Sister    • Cancer Brother    • Diabetes Brother    • Heart disease Brother        Cancer-related family history includes Cancer in her brother.    Social History     Tobacco Use   • Smoking status: Never Smoker   • Smokeless tobacco: Never Used   Substance Use Topics   • Alcohol use: No     Frequency: Never   • Drug use: No       I have reviewed the history of present illness, past medical history, family history, social history, lab results, all notes and other records since the patient was last seen at the cancer care center.    SUBJECTIVE:      Patient is my office for follow-up of: Cancer and anemia.. Patient reports to arthritis generalized  "body aches.  Does not want to participate in physical therapy.  Lays in bed all the time.  Reports to fatigue and tired.  Has home health but she does not participate in physical therapy.  She reports physical therapy makes her body aches worse.  Daughter reports that she likes to spend all the time in bed  ROS:      Review of Systems   Constitutional: Negative for fever.   HENT: Negative for nosebleeds and trouble swallowing.    Eyes: Negative for visual disturbance.   Respiratory: Negative for cough, shortness of breath and wheezing.    Cardiovascular: Negative for chest pain.   Gastrointestinal: Negative for abdominal pain and blood in stool.   Endocrine: Negative for cold intolerance.   Genitourinary: Negative for dysuria and hematuria.   Musculoskeletal: Negative for joint swelling.   Skin: Negative for rash.   Allergic/Immunologic: Negative for environmental allergies.   Neurological: Negative for seizures.   Hematological: Does not bruise/bleed easily.   Psychiatric/Behavioral: The patient is not nervous/anxious.        MD performed ROS and are negative except as mentioned in Subjective.    Objective:       Vitals:    06/11/20 0859   BP: 160/96   Pulse: 74   Temp: 97.5 °F (36.4 °C)   Weight: 87.5 kg (193 lb)   Height: 152.4 cm (60\")     /96   Pulse 74   Temp 97.5 °F (36.4 °C)   Ht 152.4 cm (60\")   Wt 87.5 kg (193 lb)   BMI 37.69 kg/m²       PHYSICAL EXAM:      Physical Exam   Constitutional: She is oriented to person, place, and time. No distress.   Well-built obese   HENT:   Head: Normocephalic and atraumatic.   Eyes: Conjunctivae and EOM are normal. Right eye exhibits no discharge. Left eye exhibits no discharge. No scleral icterus.   Neck: Normal range of motion. Neck supple. No thyromegaly present.   Cardiovascular: Normal rate, regular rhythm and normal heart sounds. Exam reveals no gallop and no friction rub.   Pulmonary/Chest: Effort normal. No stridor. No respiratory distress. She has no " wheezes.   Abdominal: Soft. Bowel sounds are normal. She exhibits no mass. There is no tenderness. There is no rebound and no guarding.   Well-healed scars no palpable organomegaly   Musculoskeletal: Normal range of motion. She exhibits no tenderness.   Trace ankle edema   Lymphadenopathy:     She has no cervical adenopathy.   Neurological: She is alert and oriented to person, place, and time. She exhibits normal muscle tone.   Bilateral shoulder pressure points   Skin: Skin is warm. No rash noted. She is not diaphoretic. No erythema.   Psychiatric: She has a normal mood and affect. Her behavior is normal.   Nursing note and vitals reviewed.   Physical exam done by MD.      RECENT LABS:     WBC   Date Value Ref Range Status   12/12/2019 5.40 3.40 - 10.80 10*3/mm3 Final     RBC   Date Value Ref Range Status   12/12/2019 4.06 3.77 - 5.28 10*6/mm3 Final     Hemoglobin   Date Value Ref Range Status   12/12/2019 11.2 (L) 12.0 - 15.9 g/dL Final     Hematocrit   Date Value Ref Range Status   12/12/2019 34.1 34.0 - 46.6 % Final     MCV   Date Value Ref Range Status   12/12/2019 84.1 79.0 - 97.0 fL Final     MCH   Date Value Ref Range Status   12/12/2019 27.5 26.6 - 33.0 pg Final     MCHC   Date Value Ref Range Status   12/12/2019 32.7 31.5 - 35.7 g/dL Final     RDW   Date Value Ref Range Status   12/12/2019 15.4 12.3 - 15.4 % Final     RDW-SD   Date Value Ref Range Status   12/12/2019 45.9 37.0 - 54.0 fl Final     MPV   Date Value Ref Range Status   12/12/2019 9.1 6.0 - 12.0 fL Final     Platelets   Date Value Ref Range Status   12/12/2019 249 140 - 450 10*3/mm3 Final     Neutrophil %   Date Value Ref Range Status   12/12/2019 37.7 (L) 42.7 - 76.0 % Final     Lymphocyte %   Date Value Ref Range Status   12/12/2019 46.4 (H) 19.6 - 45.3 % Final     Monocyte %   Date Value Ref Range Status   12/12/2019 9.5 5.0 - 12.0 % Final     Eosinophil %   Date Value Ref Range Status   12/12/2019 4.2 0.3 - 6.2 % Final     Basophil %   Date  Value Ref Range Status   12/12/2019 2.2 (H) 0.0 - 1.5 % Final     Neutrophils, Absolute   Date Value Ref Range Status   12/12/2019 2.00 1.70 - 7.00 10*3/mm3 Final     Lymphocytes, Absolute   Date Value Ref Range Status   12/12/2019 2.50 0.70 - 3.10 10*3/mm3 Final     Monocytes, Absolute   Date Value Ref Range Status   12/12/2019 0.50 0.10 - 0.90 10*3/mm3 Final     Eosinophils, Absolute   Date Value Ref Range Status   12/12/2019 0.20 0.00 - 0.40 10*3/mm3 Final     Basophils, Absolute   Date Value Ref Range Status   12/12/2019 0.10 0.00 - 0.20 10*3/mm3 Final     nRBC   Date Value Ref Range Status   12/12/2019 0.0 0.0 - 0.2 /100 WBC Final       Lab Results   Component Value Date    GLUCOSE 186 (H) 10/31/2019    BUN 11 12/12/2019    CREATININE 0.75 12/12/2019    EGFRIFNONA 77 12/12/2019    EGFRIFAFRI 88 12/12/2019    BCR 15 12/12/2019    K 5.1 12/12/2019    CO2 23 12/12/2019    CALCIUM 8.5 (L) 12/12/2019    PROTENTOTREF 5.8 (L) 12/12/2019    ALBUMIN 4.0 12/12/2019    LABIL2 2.2 12/12/2019    AST 29 12/12/2019    ALT 18 12/12/2019         Assessment/Plan      ASSESSMENT:     1. 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 Chronic iron deficiency anemia  3. Vitamin B12 deficiency status post gastric bypass surgery at age 38  4. Elevated tumor marker   5. Fibromyalgia  6. Obesity BMI 38  7. ECOG 1    PLAN:      1. I will obtain CT scans given the elevated CEA.    2. Patient will  undergo one-year colonoscopy in July 2020  3. Check CBC including hemoglobin and MCV today.  If MCV is low will give her intravenous iron.  4. Ongoing monthly vitamin B12 injection.  Iron studies along with B12 and folic acid  5. Check CMP and CEA today.  Get iron studies.  6. Dr. Delvis Martines is managing her arthritis and fibromyalgia.  7. I encouraged her to participate with the home health and physical therapy.  8. Encouraged her to lose weight count and exercise.  9. I will see her back in my office in 2 weeks    I have  reviewed labs results, imaging, vitals, and medications with the patient today.      Patient verbalized understanding and is in agreement of the above plan.  Electronically signed by Juanita Coats MD, 06/11/20, 9:29 AM.          This report was compiled using Dragon voice recognition software. I have made every effort to proof read this document; however, typographical errors may persist.

## 2020-06-12 LAB
ALBUMIN SERPL-MCNC: 4.2 G/DL (ref 3.7–4.7)
ALBUMIN/GLOB SERPL: 2.1 {RATIO} (ref 1.2–2.2)
ALP SERPL-CCNC: 82 IU/L (ref 39–117)
ALT SERPL-CCNC: 18 IU/L (ref 0–32)
AST SERPL-CCNC: 25 IU/L (ref 0–40)
BASOPHILS # BLD AUTO: 0.1 X10E3/UL (ref 0–0.2)
BASOPHILS NFR BLD AUTO: 2 %
BILIRUB SERPL-MCNC: 0.4 MG/DL (ref 0–1.2)
BUN SERPL-MCNC: 18 MG/DL (ref 8–27)
BUN/CREAT SERPL: 22 (ref 12–28)
CALCIUM SERPL-MCNC: 10 MG/DL (ref 8.7–10.3)
CEA SERPL-MCNC: 3.5 NG/ML (ref 0–4.7)
CHLORIDE SERPL-SCNC: 99 MMOL/L (ref 96–106)
CO2 SERPL-SCNC: 27 MMOL/L (ref 20–29)
CREAT SERPL-MCNC: 0.83 MG/DL (ref 0.57–1)
EOSINOPHIL # BLD AUTO: 0.2 X10E3/UL (ref 0–0.4)
EOSINOPHIL # BLD AUTO: 4 %
ERYTHROCYTE [DISTWIDTH] IN BLOOD BY AUTOMATED COUNT: 12.9 % (ref 11.7–15.4)
ETHNIC BACKGROUND STATED: 9.8 MIU/ML (ref 2.6–18.5)
FERRITIN SERPL-MCNC: 344 NG/ML (ref 15–150)
FOLATE SERPL-MCNC: >20 NG/ML
GLOBULIN SER CALC-MCNC: 2 G/DL (ref 1.5–4.5)
GLUCOSE SERPL-MCNC: 121 MG/DL (ref 65–99)
HAPTOGLOB SERPL-MCNC: 126 MG/DL (ref 42–346)
HCT VFR BLD AUTO: 37.5 % (ref 34–46.6)
HGB BLD-MCNC: 12.2 G/DL (ref 11.1–15.9)
IMM GRANULOCYTES # BLD: 0 X10E3/UL (ref 0–0.1)
IMM GRANULOCYTES NFR BLD: 0 %
IRON SATN MFR SERPL: 45 % (ref 15–55)
IRON SERPL-MCNC: 127 UG/DL (ref 27–139)
LOPINAVIR ISLT PHENOTYP: 155 UG/DL (ref 118–369)
LYMPHOCYTES # BLD AUTO: 2.3 X10E3/UL (ref 0.7–3.1)
LYMPHOCYTES NFR BLD AUTO: 39 %
MCH RBC QN AUTO: 30.1 PG (ref 26.6–33)
MCHC RBC AUTO-ENTMCNC: 32.5 G/DL (ref 31.5–35.7)
MCV RBC AUTO: 93 FL (ref 79–97)
MONOCYTES # BLD AUTO: 0.5 X10E3/UL (ref 0.1–0.9)
MONOCYTES NFR BLD AUTO: 9 %
NEUTROPHILS # BLD AUTO: 2.7 X10E3/UL (ref 1.4–7)
NEUTROPHILS NFR BLD AUTO: 46 %
PLATELET # BLD AUTO: 232 X10E3/UL (ref 150–450)
POTASSIUM SERPL-SCNC: 4.9 MMOL/L (ref 3.5–5.2)
PROT SERPL-MCNC: 6.2 G/DL (ref 6–8.5)
RBC # BLD AUTO: 4.05 X10E6/UL (ref 3.77–5.28)
RETICS/RBC NFR MANUAL: 3.4 % (ref 0.6–2.6)
SODIUM SERPL-SCNC: 141 MMOL/L (ref 134–144)
TIBC SERPL-MCNC: 282 UG/DL (ref 250–450)
VIT B12 SERPL-MCNC: 985 PG/ML (ref 232–1245)
WBC # BLD AUTO: 5.9 X10E3/UL (ref 3.4–10.8)

## 2020-06-18 ENCOUNTER — HOSPITAL ENCOUNTER (OUTPATIENT)
Dept: CT IMAGING | Facility: HOSPITAL | Age: 80
Discharge: HOME | End: 2020-06-18
Admitting: INTERNAL MEDICINE

## 2020-06-18 ENCOUNTER — OFFICE (AMBULATORY)
Dept: URBAN - METROPOLITAN AREA CLINIC 64 | Facility: CLINIC | Age: 80
End: 2020-06-18

## 2020-06-18 VITALS
SYSTOLIC BLOOD PRESSURE: 138 MMHG | HEART RATE: 78 BPM | DIASTOLIC BLOOD PRESSURE: 77 MMHG | HEIGHT: 60 IN | WEIGHT: 199 LBS

## 2020-06-18 DIAGNOSIS — D50.9 IRON DEFICIENCY ANEMIA, UNSPECIFIED: ICD-10-CM

## 2020-06-18 DIAGNOSIS — K31.84 GASTROPARESIS: ICD-10-CM

## 2020-06-18 DIAGNOSIS — Z85.038 PERSONAL HISTORY OF OTHER MALIGNANT NEOPLASM OF LARGE INTEST: ICD-10-CM

## 2020-06-18 DIAGNOSIS — C18.4 MALIGNANT NEOPLASM OF TRANSVERSE COLON (HCC): ICD-10-CM

## 2020-06-18 DIAGNOSIS — R10.9 UNSPECIFIED ABDOMINAL PAIN: ICD-10-CM

## 2020-06-18 DIAGNOSIS — R13.10 DYSPHAGIA, UNSPECIFIED: ICD-10-CM

## 2020-06-18 DIAGNOSIS — R14.0 ABDOMINAL DISTENSION (GASEOUS): ICD-10-CM

## 2020-06-18 DIAGNOSIS — G45.9 TRANSIENT CEREBRAL ISCHEMIC ATTACK, UNSPECIFIED: ICD-10-CM

## 2020-06-18 DIAGNOSIS — Z79.02 LONG TERM (CURRENT) USE OF ANTITHROMBOTICS/ANTIPLATELETS: ICD-10-CM

## 2020-06-18 PROCEDURE — 74177 CT ABD & PELVIS W/CONTRAST: CPT

## 2020-06-18 PROCEDURE — 99214 OFFICE O/P EST MOD 30 MIN: CPT | Performed by: NURSE PRACTITIONER

## 2020-06-18 PROCEDURE — 0 IOPAMIDOL PER 1 ML: Performed by: INTERNAL MEDICINE

## 2020-06-18 PROCEDURE — 71260 CT THORAX DX C+: CPT

## 2020-06-18 RX ORDER — MAGNESIUM CITRATE 1.75 G/29.6ML
LIQUID ORAL
Qty: 120 | Refills: 0 | Status: COMPLETED
Start: 2020-06-18 | End: 2020-10-01

## 2020-06-18 RX ORDER — LEVOFLOXACIN 500 MG/1
500 TABLET, FILM COATED ORAL DAILY
Qty: 7 TABLET | Refills: 0 | Status: SHIPPED | OUTPATIENT
Start: 2020-06-18 | End: 2020-06-25

## 2020-06-18 RX ADMIN — IOPAMIDOL 100 ML: 755 INJECTION, SOLUTION INTRAVENOUS at 12:05

## 2020-06-18 NOTE — TELEPHONE ENCOUNTER
Spoke with daughter informing her Rx for levaquin being sent in. Daughter requested it be sent to [facility] since [facility] will be closed before she can get there. Sent to [facility].

## 2020-06-25 ENCOUNTER — OFFICE VISIT (OUTPATIENT)
Dept: ONCOLOGY | Facility: CLINIC | Age: 80
End: 2020-06-25

## 2020-06-25 ENCOUNTER — LAB (OUTPATIENT)
Dept: LAB | Facility: HOSPITAL | Age: 80
End: 2020-06-25

## 2020-06-25 VITALS
WEIGHT: 196 LBS | TEMPERATURE: 98 F | BODY MASS INDEX: 38.48 KG/M2 | HEIGHT: 60 IN | DIASTOLIC BLOOD PRESSURE: 86 MMHG | SYSTOLIC BLOOD PRESSURE: 160 MMHG | HEART RATE: 88 BPM

## 2020-06-25 DIAGNOSIS — D50.8 IRON DEFICIENCY ANEMIA SECONDARY TO INADEQUATE DIETARY IRON INTAKE: Primary | ICD-10-CM

## 2020-06-25 DIAGNOSIS — C18.4 MALIGNANT NEOPLASM OF TRANSVERSE COLON (HCC): ICD-10-CM

## 2020-06-25 PROCEDURE — 36415 COLL VENOUS BLD VENIPUNCTURE: CPT | Performed by: INTERNAL MEDICINE

## 2020-06-25 PROCEDURE — 99214 OFFICE O/P EST MOD 30 MIN: CPT | Performed by: INTERNAL MEDICINE

## 2020-06-25 RX ORDER — LAMOTRIGINE 25 MG/1
25 TABLET ORAL DAILY
COMMUNITY
Start: 2020-06-22 | End: 2021-06-16

## 2020-06-25 NOTE — PROGRESS NOTES
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 Willa Sierra is status post gastric bypass surgery at age 38 and has chronic history of iron deficiency anemia requiring parenteral iron infusion and she is on monthly B12 injections.  Patient reports she undergoes regular screening colonoscopies this time it was prolonged by 2 years secondary to illness in the family and passing away of her .  · She had a colonoscopy done by Dr. Noland on 8/19/2019, it revealed polyps in the cecum and ascending colon which were benign.  There was a mass of 2 cm in size in the mid transverse colon.  A 4 mm polyp in the sigmoid colon which was benign.  There was mild diverticulosis of the descending colon and sigmoid colon.  Grade/stage I internal and external hemorrhoids.  · Pathology report was polyps in the cecum, sigmoid colon with tubular adenoma.  Transverse colon mass was invasive moderately differentiated adenocarcinoma with focal necrosis. MLH1: Intact nuclear expression MLH2: Intact nuclear expression  MSH6: Intact nuclear expression  PMS2: Intact nuclear expression  · IHC interpretation-no loss of nuclear expression of MMR proteins: Low probability of microsatellite instability- high(MSI-H)  · Patient was sent to the cancer care center for her newly diagnosed colon carcinoma and seen by me on 8/26/2019.    · Patient was seen by me in the past for iron deficiency anemia and was given parenteral iron infusions in 2015.  · 8/14/2019 CT scan of the chest abdomen pelvis, 1. No colonic mass lesion is identified on this CT examination. There is no CT correlate to the patient's described transverse colonic mass, seen on colonoscopy. 2. There is no evidence of metastatic  disease within the chest, abdomen or pelvis., 3. Minimal patchy groundglass density in the right upper lobe favored to represent benign mild pneumonitis or scarring. 4. Surgical changes of the EG junction stomach, in keeping with history of gastric bypass. 5. Additional CT findings include: Cholecystectomy, presumed hysterectomy, hepatic cyst, left renal cysts, chronic appearing mild compression fractures of T1 and T2, mild coronary artery calcifications.  · 10/1/2019 Segmental resection of transverse colon pathology report transverse colon tumor size 4 cm grade 1 all margins negative, 15 lymph nodes were extracted 0+.  PT 3 N0.  Surgery was done by Dr. Bowman  · 12/3/2019 CT scan of the chest with contrast - 1. No evidence of metastatic disease to the chest 2. No active cardiopulmonary disease 3. Mild coronary artery calcification 4. No acute chest or upper abdominal finding 5. No change from 08/19/2019 . CT scan of abdomen and pelvis 1. No acute abdominal or pelvic finding, no evidence of metastatic disease or primary tumor 2. Prior changes of colon resection are seen on today's study which are new from previous exam 3. Changes of gastric bypass and hysterectomy and cholecystectomy    · January 2020 patient received 2 dose of Injectafer infusions for iron deficiency anemia.  · Patient was put on Remeron for insomnia and anxiety patient gained 10 pounds after that and she took herself off of it.  · 6/18/2020 CT chest abdomen and pelvis- 1. No findings of intrathoracic metastatic disease. 2. Development of several patchy groundglass opacities in the left upper lobe suggesting infectious or inflammatory process, recommend attention on follow-up. 3. Coronary atherosclerotic disease.   ABDOMEN AND PELVIS: 1. No findings of recurrent or metastatic disease in the abdomen or pelvis. 2. Stable postsurgical changes of partial colonic resection of the transverse colon with reanastomosis.  3. Large amount stool in the colon  suggesting constipation. 4. Status post gastric bypass.    Past Medical History:   Diagnosis Date   • Anemia    • Arthritis    • Cataract    • Depression    • Osteoporosis    • Thyroid disease    • TIA (transient ischemic attack)        Past Surgical History:   Procedure Laterality Date   • CHOLECYSTECTOMY     • COLON RESECTION     • FACIAL COSMETIC SURGERY     • GASTRIC BYPASS     • HEMORRHOIDECTOMY     • HYSTERECTOMY           Current Outpatient Medications:   •  ALPRAZolam (XANAX) 2 MG tablet, Take 2 mg by mouth 2 (Two) Times a Day As Needed. for anxiety, Disp: , Rfl: 0  •  bumetanide (BUMEX) 0.5 MG tablet, bumetanide 0.5 mg tablet, Disp: , Rfl:   •  Calcium Carbonate-Vit D-Min (CALCIUM 1200) 2182-7255 MG-UNIT chewable tablet, CALCIUM 1200 9825-3925 MG-UNIT CHEW, Disp: , Rfl:   •  CBD (cannabidiol) oral oil, Take  by mouth As Needed., Disp: , Rfl:   •  clopidogrel (PLAVIX) 75 MG tablet, Take 75 mg by mouth Daily., Disp: , Rfl: 0  •  Cyanocobalamin (B-12) 1000 MCG/ML kit, 1 mL., Disp: , Rfl:   •  cyanocobalamin 1000 MCG/ML injection, CYANOCOBALAMIN 1000 MCG/ML SOLN, Disp: , Rfl:   •  denosumab (PROLIA) 60 MG/ML solution prefilled syringe syringe, PROLIA 60 MG/ML SOSY, Disp: , Rfl:   •  DULoxetine (CYMBALTA) 30 MG capsule, Take 30 mg by mouth Daily., Disp: , Rfl: 2  •  folic acid (FOLVITE) 400 MCG tablet, Take 400 mcg by mouth Daily., Disp: , Rfl:   •  hydrALAZINE (APRESOLINE) 25 MG tablet, Every 12 (Twelve) Hours., Disp: , Rfl:   •  HYDROcodone-acetaminophen (NORCO)  MG per tablet, Take 1 tablet by mouth 4 (Four) Times a Day., Disp: , Rfl: 0  •  hydroxychloroquine (PLAQUENIL) 200 MG tablet, TAKE ONE TABLET BY MOUTH EVERY DAY with food OR MILK, Disp: , Rfl: 0  •  hydrOXYzine (ATARAX) 25 MG tablet, Take 25 mg by mouth Every 6 (Six) Hours As Needed. for anxiety, Disp: , Rfl: 1  •  indomethacin (INDOCIN) 50 MG capsule, Take 50 mg by mouth As Needed (MIgraine)., Disp: , Rfl:   •  lamoTRIgine (LaMICtal) 25 MG  tablet, Take 25 mg by mouth Daily., Disp: , Rfl:   •  levoFLOXacin (Levaquin) 500 MG tablet, Take 1 tablet by mouth Daily for 7 days., Disp: 7 tablet, Rfl: 0  •  levothyroxine (SYNTHROID, LEVOTHROID) 100 MCG tablet, Take 100 mcg by mouth Daily., Disp: , Rfl: 0  •  losartan (COZAAR) 25 MG tablet, Take 25 mg by mouth Daily., Disp: , Rfl: 0  •  Magnesium 100 MG capsule, Take 165 mg by mouth Daily., Disp: , Rfl:   •  meclizine (ANTIVERT) 25 MG tablet, Take 25 mg by mouth 3 (Three) Times a Day As Needed for dizziness., Disp: , Rfl:   •  memantine (NAMENDA) 10 MG tablet, Take 10 mg by mouth 2 (Two) Times a Day., Disp: , Rfl: 3  •  metoclopramide (REGLAN) 10 MG tablet, Take 1 tablet by mouth three times a day before meals for 30 days, Disp: , Rfl:   •  metroNIDAZOLE (FLAGYL) 500 MG tablet, metronidazole 500 mg tablet, Disp: , Rfl:   •  mirtazapine (REMERON) 15 MG tablet, Take 15 mg by mouth every night at bedtime., Disp: , Rfl:   •  ondansetron (ZOFRAN) 4 MG tablet, ondansetron HCl 4 mg tablet, Disp: , Rfl:   •  pantoprazole (PROTONIX) 40 MG EC tablet, Take 40 mg by mouth Daily., Disp: , Rfl: 0  •  potassium chloride (K-DUR) 10 MEQ CR tablet, 1 TABLET WITH FOOD ONCE A DAY PRN ORALLY 15, Disp: , Rfl:   •  predniSONE (DELTASONE) 20 MG tablet, TAKE ONE TABLET BY MOUTH EVERY DAY FOR 10 DAYS, Disp: , Rfl: 0  •  risperiDONE (risperDAL) 0.5 MG tablet, Take 0.05 mg by mouth Daily., Disp: , Rfl: 0  •  sorbitol 70 % solution solution, TAKE 50ML TWO TIMES AS DIRECTED PER WRITTEN INSTRUCTIONS, Disp: , Rfl: 0  •  temazepam (RESTORIL) 15 MG capsule, Take 15 mg by mouth At Night As Needed., Disp: , Rfl:   •  tobramycin-dexamethasone (TOBRADEX) 0.3-0.1 % ophthalmic suspension, tobramycin 0.3 %-dexamethasone 0.1 % eye drops,suspension, Disp: , Rfl:   •  vitamin B-6 (PYRIDOXINE) 50 MG tablet, Take 100 mg by mouth Daily., Disp: , Rfl:     No Known Allergies    Family History   Problem Relation Age of Onset   • Heart disease Mother    •  "Diabetes Mother    • Heart disease Father    • Diabetes Father    • Stroke Father    • Heart disease Sister    • Liver disease Sister    • Cancer Brother    • Diabetes Brother    • Heart disease Brother        Cancer-related family history includes Cancer in her brother.    Social History     Tobacco Use   • Smoking status: Never Smoker   • Smokeless tobacco: Never Used   Substance Use Topics   • Alcohol use: No     Frequency: Never   • Drug use: No       I have reviewed the history of present illness, past medical history, family history, social history, lab results, all notes and other records since the patient was last seen at the [facility] center.    SUBJECTIVE:      Patient is my office for follow-up of her: Cancer and anemia.. Patient reports to arthritis generalized body aches.  Does not want to participate in physical therapy.  Trying to walk around in the house much better activity than before.  Continues to have body aches from her fibromyalgia.  ROS:      Review of Systems   Constitutional: Negative for fever.   HENT: Negative for nosebleeds and trouble swallowing.    Eyes: Negative for visual disturbance.   Respiratory: Negative for cough, shortness of breath and wheezing.    Cardiovascular: Negative for chest pain.   Gastrointestinal: Negative for abdominal pain and blood in stool.   Endocrine: Negative for cold intolerance.   Genitourinary: Negative for dysuria and hematuria.   Musculoskeletal: Negative for joint swelling.   Skin: Negative for rash.   Allergic/Immunologic: Negative for environmental allergies.   Neurological: Negative for seizures.   Hematological: Does not bruise/bleed easily.   Psychiatric/Behavioral: The patient is not nervous/anxious.        MD performed ROS and are negative except as mentioned in Subjective.    Objective:       Vitals:    06/25/20 0857   BP: 160/86   Pulse: 88   Temp: 98 °F (36.7 °C)   Weight: 88.9 kg (196 lb)   Height: 152.4 cm (60\")     /86   Pulse 88  " " Temp 98 °F (36.7 °C)   Ht 152.4 cm (60\")   Wt 88.9 kg (196 lb)   BMI 38.28 kg/m²       PHYSICAL EXAM:      Physical Exam   Constitutional: She is oriented to person, place, and time. No distress.   Well-built obese   HENT:   Head: Normocephalic and atraumatic.   Eyes: Conjunctivae and EOM are normal. Right eye exhibits no discharge. Left eye exhibits no discharge. No scleral icterus.   Neck: Normal range of motion. Neck supple. No thyromegaly present.   Cardiovascular: Normal rate, regular rhythm and normal heart sounds. Exam reveals no gallop and no friction rub.   Pulmonary/Chest: Effort normal. No stridor. No respiratory distress. She has no wheezes.   Abdominal: Soft. Bowel sounds are normal. She exhibits no mass. There is no tenderness. There is no rebound and no guarding.   Well-healed scars no palpable organomegaly   Musculoskeletal: Normal range of motion. She exhibits no tenderness.   Trace ankle edema   Lymphadenopathy:     She has no cervical adenopathy.   Neurological: She is alert and oriented to person, place, and time. She exhibits normal muscle tone.   Bilateral shoulder pressure points   Skin: Skin is warm. No rash noted. She is not diaphoretic. No erythema.   Psychiatric: She has a normal mood and affect. Her behavior is normal.   Nursing note and vitals reviewed.   Physical exam done by MD.      RECENT LABS:     WBC   Date Value Ref Range Status   06/11/2020 5.9 3.4 - 10.8 x10E3/uL Final     RBC   Date Value Ref Range Status   06/11/2020 4.05 3.77 - 5.28 x10E6/uL Final     Hemoglobin   Date Value Ref Range Status   06/11/2020 12.2 11.1 - 15.9 g/dL Final   12/12/2019 11.2 (L) 12.0 - 15.9 g/dL Final     Hematocrit   Date Value Ref Range Status   06/11/2020 37.5 34.0 - 46.6 % Final   12/12/2019 34.1 34.0 - 46.6 % Final     MCV   Date Value Ref Range Status   06/11/2020 93 79 - 97 fL Final   12/12/2019 84.1 79.0 - 97.0 fL Final     MCH   Date Value Ref Range Status   06/11/2020 30.1 26.6 - 33.0 " pg Final   12/12/2019 27.5 26.6 - 33.0 pg Final     MCHC   Date Value Ref Range Status   06/11/2020 32.5 31.5 - 35.7 g/dL Final   12/12/2019 32.7 31.5 - 35.7 g/dL Final     RDW   Date Value Ref Range Status   06/11/2020 12.9 11.7 - 15.4 % Final   12/12/2019 15.4 12.3 - 15.4 % Final     RDW-SD   Date Value Ref Range Status   12/12/2019 45.9 37.0 - 54.0 fl Final     MPV   Date Value Ref Range Status   12/12/2019 9.1 6.0 - 12.0 fL Final     Platelets   Date Value Ref Range Status   06/11/2020 232 150 - 450 x10E3/uL Final   12/12/2019 249 140 - 450 10*3/mm3 Final     Neutrophil Rel %   Date Value Ref Range Status   06/11/2020 46 Not Estab. % Final     Neutrophil %   Date Value Ref Range Status   12/12/2019 37.7 (L) 42.7 - 76.0 % Final     Lymphocyte Rel %   Date Value Ref Range Status   06/11/2020 39 Not Estab. % Final     Lymphocyte %   Date Value Ref Range Status   12/12/2019 46.4 (H) 19.6 - 45.3 % Final     Monocyte Rel %   Date Value Ref Range Status   06/11/2020 9 Not Estab. % Final     Monocyte %   Date Value Ref Range Status   12/12/2019 9.5 5.0 - 12.0 % Final     Eosinophil Rel %   Date Value Ref Range Status   06/11/2020 4 Not Estab. % Final     Eosinophil %   Date Value Ref Range Status   12/12/2019 4.2 0.3 - 6.2 % Final     Basophil Rel %   Date Value Ref Range Status   06/11/2020 2 Not Estab. % Final     Basophil %   Date Value Ref Range Status   12/12/2019 2.2 (H) 0.0 - 1.5 % Final     Neutrophils Absolute   Date Value Ref Range Status   06/11/2020 2.7 1.4 - 7.0 x10E3/uL Final     Neutrophils, Absolute   Date Value Ref Range Status   12/12/2019 2.00 1.70 - 7.00 10*3/mm3 Final     Lymphocytes Absolute   Date Value Ref Range Status   06/11/2020 2.3 0.7 - 3.1 x10E3/uL Final     Lymphocytes, Absolute   Date Value Ref Range Status   12/12/2019 2.50 0.70 - 3.10 10*3/mm3 Final     Monocytes Absolute   Date Value Ref Range Status   06/11/2020 0.5 0.1 - 0.9 x10E3/uL Final     Monocytes, Absolute   Date Value Ref  Range Status   12/12/2019 0.50 0.10 - 0.90 10*3/mm3 Final     Eosinophils Absolute   Date Value Ref Range Status   06/11/2020 0.2 0.0 - 0.4 x10E3/uL Final     Eosinophils, Absolute   Date Value Ref Range Status   12/12/2019 0.20 0.00 - 0.40 10*3/mm3 Final     Basophils Absolute   Date Value Ref Range Status   06/11/2020 0.1 0.0 - 0.2 x10E3/uL Final     Basophils, Absolute   Date Value Ref Range Status   12/12/2019 0.10 0.00 - 0.20 10*3/mm3 Final     nRBC   Date Value Ref Range Status   12/12/2019 0.0 0.0 - 0.2 /100 WBC Final       Lab Results   Component Value Date    GLUCOSE 186 (H) 10/31/2019    BUN 18 06/11/2020    CREATININE 0.83 06/11/2020    EGFRIFNONA 68 06/11/2020    EGFRIFAFRI 78 06/11/2020    BCR 22 06/11/2020    K 4.9 06/11/2020    CO2 27 06/11/2020    CALCIUM 10.0 06/11/2020    PROTENTOTREF 6.2 06/11/2020    ALBUMIN 4.2 06/11/2020    LABIL2 2.1 06/11/2020    AST 25 06/11/2020    ALT 18 06/11/2020         Assessment/Plan      ASSESSMENT:     1. 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 Vitamin B12 deficiency status post gastric bypass surgery at age 38  3. Elevated tumor marker   4. Fibromyalgia  5. Obesity BMI 38  6. Iron deficiency post gastric bypass surgery  7. ECOG 1    PLAN:      1. I reviewed the CT scan results with the patient and her daughter which does not show any evidence of disease.  2. Check CMP and CEA today.  3. Patient will  undergo one-year colonoscopy in August.  Check CBC including hemoglobin and MCV today.  If MCV is low will give her intravenous iron.  4. Ongoing monthly vitamin B12 injection.  Iron studies along with B12 and folic acid  5. Dr. Delvis Martines is managing her arthritis and fibromyalgia.  6. I encouraged her to participate with the home health and physical therapy.  7. Encouraged her to lose weight count and exercise.  8. I will see her back in my office in 4 months    I have reviewed labs results, imaging, vitals, and medications with the patient  today.      Patient verbalized understanding and is in agreement of the above plan.  Electronically signed by Juanita Coats MD, 06/25/20, 12:26 PM.            This report was compiled using Dragon voice recognition software. I have made every effort to proof read this document; however, typographical errors may persist.

## 2020-06-26 LAB
FERRITIN SERPL-MCNC: 373 NG/ML (ref 15–150)
IRON SATN MFR SERPL: 34 % (ref 15–55)
IRON SERPL-MCNC: 96 UG/DL (ref 27–139)
LOPINAVIR ISLT PHENOTYP: 187 UG/DL (ref 118–369)
TIBC SERPL-MCNC: 283 UG/DL (ref 250–450)

## 2020-08-10 ENCOUNTER — ON CAMPUS - OUTPATIENT (AMBULATORY)
Dept: URBAN - METROPOLITAN AREA HOSPITAL 2 | Facility: HOSPITAL | Age: 80
End: 2020-08-10

## 2020-08-10 ENCOUNTER — OFFICE (AMBULATORY)
Dept: URBAN - METROPOLITAN AREA PATHOLOGY 4 | Facility: PATHOLOGY | Age: 80
End: 2020-08-10
Payer: COMMERCIAL

## 2020-08-10 ENCOUNTER — OFFICE (AMBULATORY)
Dept: URBAN - METROPOLITAN AREA PATHOLOGY 4 | Facility: PATHOLOGY | Age: 80
End: 2020-08-10

## 2020-08-10 VITALS
WEIGHT: 196 LBS | SYSTOLIC BLOOD PRESSURE: 118 MMHG | DIASTOLIC BLOOD PRESSURE: 69 MMHG | OXYGEN SATURATION: 59 % | HEART RATE: 75 BPM | HEART RATE: 58 BPM | DIASTOLIC BLOOD PRESSURE: 78 MMHG | DIASTOLIC BLOOD PRESSURE: 73 MMHG | SYSTOLIC BLOOD PRESSURE: 132 MMHG | DIASTOLIC BLOOD PRESSURE: 59 MMHG | DIASTOLIC BLOOD PRESSURE: 70 MMHG | OXYGEN SATURATION: 98 % | HEART RATE: 56 BPM | RESPIRATION RATE: 18 BRPM | HEIGHT: 60 IN | TEMPERATURE: 96.9 F | SYSTOLIC BLOOD PRESSURE: 125 MMHG | OXYGEN SATURATION: 100 % | HEART RATE: 57 BPM | SYSTOLIC BLOOD PRESSURE: 113 MMHG | SYSTOLIC BLOOD PRESSURE: 127 MMHG | SYSTOLIC BLOOD PRESSURE: 121 MMHG | RESPIRATION RATE: 16 BRPM | HEART RATE: 61 BPM | DIASTOLIC BLOOD PRESSURE: 65 MMHG | HEART RATE: 69 BPM | SYSTOLIC BLOOD PRESSURE: 98 MMHG | DIASTOLIC BLOOD PRESSURE: 75 MMHG | OXYGEN SATURATION: 99 % | OXYGEN SATURATION: 95 % | SYSTOLIC BLOOD PRESSURE: 148 MMHG | SYSTOLIC BLOOD PRESSURE: 116 MMHG | HEART RATE: 66 BPM | SYSTOLIC BLOOD PRESSURE: 135 MMHG | SYSTOLIC BLOOD PRESSURE: 119 MMHG | HEART RATE: 63 BPM | HEART RATE: 18 BPM | SYSTOLIC BLOOD PRESSURE: 145 MMHG | OXYGEN SATURATION: 96 % | HEART RATE: 60 BPM

## 2020-08-10 DIAGNOSIS — K25.9 GASTRIC ULCER, UNSPECIFIED AS ACUTE OR CHRONIC, WITHOUT HEMO: ICD-10-CM

## 2020-08-10 DIAGNOSIS — D12.3 BENIGN NEOPLASM OF TRANSVERSE COLON: ICD-10-CM

## 2020-08-10 DIAGNOSIS — D12.2 BENIGN NEOPLASM OF ASCENDING COLON: ICD-10-CM

## 2020-08-10 DIAGNOSIS — Z85.038 PERSONAL HISTORY OF OTHER MALIGNANT NEOPLASM OF LARGE INTEST: ICD-10-CM

## 2020-08-10 DIAGNOSIS — K31.89 OTHER DISEASES OF STOMACH AND DUODENUM: ICD-10-CM

## 2020-08-10 DIAGNOSIS — K64.0 FIRST DEGREE HEMORRHOIDS: ICD-10-CM

## 2020-08-10 DIAGNOSIS — Z48.815 ENCOUNTER FOR SURGICAL AFTERCARE FOLLOWING SURGERY ON THE DI: ICD-10-CM

## 2020-08-10 DIAGNOSIS — K57.30 DIVERTICULOSIS OF LARGE INTESTINE WITHOUT PERFORATION OR ABS: ICD-10-CM

## 2020-08-10 DIAGNOSIS — R07.9 CHEST PAIN, UNSPECIFIED: ICD-10-CM

## 2020-08-10 LAB
GI HISTOLOGY: A. UNSPECIFIED: (no result)
GI HISTOLOGY: B. UNSPECIFIED: (no result)
GI HISTOLOGY: PDF REPORT: (no result)

## 2020-08-10 PROCEDURE — 88305 TISSUE EXAM BY PATHOLOGIST: CPT | Mod: 26 | Performed by: INTERNAL MEDICINE

## 2020-08-10 PROCEDURE — 43235 EGD DIAGNOSTIC BRUSH WASH: CPT | Mod: 59 | Performed by: INTERNAL MEDICINE

## 2020-08-10 PROCEDURE — 45385 COLONOSCOPY W/LESION REMOVAL: CPT | Mod: PT | Performed by: INTERNAL MEDICINE

## 2020-08-10 PROCEDURE — 45380 COLONOSCOPY AND BIOPSY: CPT | Mod: 59,PT | Performed by: INTERNAL MEDICINE

## 2020-10-01 ENCOUNTER — OFFICE (AMBULATORY)
Dept: URBAN - METROPOLITAN AREA CLINIC 64 | Facility: CLINIC | Age: 80
End: 2020-10-01

## 2020-10-01 VITALS
DIASTOLIC BLOOD PRESSURE: 70 MMHG | HEIGHT: 60 IN | SYSTOLIC BLOOD PRESSURE: 123 MMHG | WEIGHT: 205 LBS | HEART RATE: 65 BPM

## 2020-10-01 DIAGNOSIS — K29.40 CHRONIC ATROPHIC GASTRITIS WITHOUT BLEEDING: ICD-10-CM

## 2020-10-01 DIAGNOSIS — R11.2 NAUSEA WITH VOMITING, UNSPECIFIED: ICD-10-CM

## 2020-10-01 DIAGNOSIS — K21.9 GASTRO-ESOPHAGEAL REFLUX DISEASE WITHOUT ESOPHAGITIS: ICD-10-CM

## 2020-10-01 DIAGNOSIS — K31.84 GASTROPARESIS: ICD-10-CM

## 2020-10-01 DIAGNOSIS — R14.0 ABDOMINAL DISTENSION (GASEOUS): ICD-10-CM

## 2020-10-01 PROCEDURE — 99214 OFFICE O/P EST MOD 30 MIN: CPT | Performed by: NURSE PRACTITIONER

## 2020-10-01 RX ORDER — PANTOPRAZOLE SODIUM 40 MG/1
TABLET, DELAYED RELEASE ORAL
Qty: 90 | Refills: 3 | Status: COMPLETED
End: 2020-10-01

## 2020-10-01 RX ORDER — DEXLANSOPRAZOLE 60 MG/1
60 CAPSULE, DELAYED RELEASE ORAL
Qty: 90 | Refills: 3 | Status: COMPLETED
Start: 2020-10-01 | End: 2021-06-30

## 2020-10-29 ENCOUNTER — OFFICE VISIT (OUTPATIENT)
Dept: ONCOLOGY | Facility: CLINIC | Age: 80
End: 2020-10-29

## 2020-10-29 ENCOUNTER — APPOINTMENT (OUTPATIENT)
Dept: LAB | Facility: HOSPITAL | Age: 80
End: 2020-10-29

## 2020-10-29 VITALS
TEMPERATURE: 96.9 F | DIASTOLIC BLOOD PRESSURE: 63 MMHG | HEART RATE: 69 BPM | WEIGHT: 199 LBS | HEIGHT: 60 IN | SYSTOLIC BLOOD PRESSURE: 135 MMHG | BODY MASS INDEX: 39.07 KG/M2

## 2020-10-29 DIAGNOSIS — R53.82 CHRONIC FATIGUE: ICD-10-CM

## 2020-10-29 DIAGNOSIS — C18.4 MALIGNANT NEOPLASM OF TRANSVERSE COLON (HCC): Primary | ICD-10-CM

## 2020-10-29 DIAGNOSIS — E66.01 MORBIDLY OBESE (HCC): ICD-10-CM

## 2020-10-29 PROBLEM — D50.8 IRON DEFICIENCY ANEMIA SECONDARY TO INADEQUATE DIETARY IRON INTAKE: Status: RESOLVED | Noted: 2019-12-13 | Resolved: 2020-10-29

## 2020-10-29 PROCEDURE — 99213 OFFICE O/P EST LOW 20 MIN: CPT | Performed by: INTERNAL MEDICINE

## 2020-10-29 NOTE — PROGRESS NOTES
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 Willa Sierra is status post gastric bypass surgery at age 38 and has chronic history of iron deficiency anemia requiring parenteral iron infusion and she is on monthly B12 injections.  Patient reports she undergoes regular screening colonoscopies this time it was prolonged by 2 years secondary to illness in the family and passing away of her .  · She had a colonoscopy done by Dr. Noland on 8/19/2019, it revealed polyps in the cecum and ascending colon which were benign.  There was a mass of 2 cm in size in the mid transverse colon.  A 4 mm polyp in the sigmoid colon which was benign.  There was mild diverticulosis of the descending colon and sigmoid colon.  Grade/stage I internal and external hemorrhoids.  · Pathology report was polyps in the cecum, sigmoid colon with tubular adenoma.  Transverse colon mass was invasive moderately differentiated adenocarcinoma with focal necrosis. MLH1: Intact nuclear expression MLH2: Intact nuclear expression  MSH6: Intact nuclear expression  PMS2: Intact nuclear expression  · IHC interpretation-no loss of nuclear expression of MMR proteins: Low probability of microsatellite instability = MSI low  · Patient was sent to the cancer [facility] center for her newly diagnosed colon carcinoma and seen by me on 8/26/2019.    · Patient was seen by me in the past for iron deficiency anemia and was given parenteral iron infusions in 2015.  · 8/14/2019 CT scan of the chest abdomen pelvis, 1. No colonic mass lesion is identified on this CT examination. There is no CT correlate to the patient's described transverse colonic mass, seen on colonoscopy. 2. There is no evidence of metastatic disease within the chest, abdomen or  pelvis., 3. Minimal patchy groundglass density in the right upper lobe favored to represent benign mild pneumonitis or scarring. 4. Surgical changes of the EG junction stomach, in keeping with history of gastric bypass. 5. Additional CT findings include: Cholecystectomy, presumed hysterectomy, hepatic cyst, left renal cysts, chronic appearing mild compression fractures of T1 and T2, mild coronary artery calcifications.  · 10/1/2019 Segmental resection of transverse colon pathology report transverse colon tumor size 4 cm grade 1 all margins negative, 15 lymph nodes were extracted 0+.  PT 3 N0.  Surgery was done by Dr. Bowman  · 12/3/2019 CT scan of the chest with contrast - 1. No evidence of metastatic disease to the chest 2. No active cardiopulmonary disease 3. Mild coronary artery calcification 4. No acute chest or upper abdominal finding 5. No change from 08/19/2019 . CT scan of abdomen and pelvis 1. No acute abdominal or pelvic finding, no evidence of metastatic disease or primary tumor 2. Prior changes of colon resection are seen on today's study which are new from previous exam 3. Changes of gastric bypass and hysterectomy and cholecystectomy    · January 2020 patient received 2 dose of Injectafer infusions for iron deficiency anemia.  · Patient was put on Remeron for insomnia and anxiety patient gained 10 pounds after that and she took herself off of it.  · 6/18/2020 CT chest abdomen and pelvis- 1. No findings of intrathoracic metastatic disease. 2. Development of several patchy groundglass opacities in the left upper lobe suggesting infectious or inflammatory process, recommend attention on follow-up. 3. Coronary atherosclerotic disease.   ABDOMEN AND PELVIS: 1. No findings of recurrent or metastatic disease in the abdomen or pelvis. 2. Stable postsurgical changes of partial colonic resection of the transverse colon with reanastomosis.  3. Large amount stool in the colon suggesting constipation. 4. Status  post gastric bypass.    HISTORY OF PRESENT ILLNESS: She comes accompanied by her daughter. Of note, this patient and all of these medical issues are new to me. Since her last visit, she has been up and down.   She is fatigued all the time.  She experiences  polypharmacy.  She has fibromyalgia.  She takes the  Temazepam nightly and the alpraolam  3x/day. She had colonoscopy in August 2020 and was determined not to need another colonoscopy in 3 years.        Review of Systems - Oncology     Review of systems:  Constitutional: fatigue fever, night sweats or weight loss  HEENT: Denies headache, vision changes, dysphagia or odynophagia   Lymph nodes: Denies lymphadenopathy  Heme: Denies bleeding or abnormal bruising  Respiratory: Denies dyspnea, cough - wears O2 at night to sleep   Cardiovascular: Denies chest pain, palpitations; lower extremity swelling  GI: chronic abdominal pain since surgery; bowel movements are good   : Denies dysuria or hematuria  Musculoskeletal: myalgia or arthralgia  Skin: Denies rash or skin lesions  Neurologic: neuropathy  Endocrine: denies hot flashes  Psychologic: anxiety    Past Medical History:   Diagnosis Date   • Anemia    • Arthritis    • Cataract    • Depression    • Osteoporosis    • Thyroid disease    • TIA (transient ischemic attack)        Past Surgical History:   Procedure Laterality Date   • CHOLECYSTECTOMY     • COLON RESECTION     • FACIAL COSMETIC SURGERY     • GASTRIC BYPASS     • HEMORRHOIDECTOMY     • HYSTERECTOMY         Family History   Problem Relation Age of Onset   • Heart disease Mother    • Diabetes Mother    • Heart disease Father    • Diabetes Father    • Stroke Father    • Heart disease Sister    • Liver disease Sister    • Cancer Brother    • Diabetes Brother    • Heart disease Brother        Social History     Socioeconomic History   • Marital status:      Spouse name: Not on file   • Number of children: Not on file   • Years of education: Not on file    • Highest education level: Not on file   Tobacco Use   • Smoking status: Never Smoker   • Smokeless tobacco: Never Used   Substance and Sexual Activity   • Alcohol use: No     Frequency: Never   • Drug use: No       ALLERGIES:  Patient has no known allergies.    MEDICATION:  Current Outpatient Medications   Medication Sig Dispense Refill   • ALPRAZolam (XANAX) 2 MG tablet Take 2 mg by mouth 2 (Two) Times a Day As Needed. for anxiety  0   • bumetanide (BUMEX) 0.5 MG tablet bumetanide 0.5 mg tablet     • Calcium Carbonate-Vit D-Min (CALCIUM 1200) 8846-1028 MG-UNIT chewable tablet CALCIUM 1200 8160-2302 MG-UNIT CHEW     • CBD (cannabidiol) oral oil Take  by mouth As Needed.     • clopidogrel (PLAVIX) 75 MG tablet Take 75 mg by mouth Daily.  0   • Cyanocobalamin (B-12) 1000 MCG/ML kit 1 mL.     • cyanocobalamin 1000 MCG/ML injection CYANOCOBALAMIN 1000 MCG/ML SOLN     • denosumab (PROLIA) 60 MG/ML solution prefilled syringe syringe PROLIA 60 MG/ML SOSY     • DULoxetine (CYMBALTA) 30 MG capsule Take 30 mg by mouth Daily.  2   • folic acid (FOLVITE) 400 MCG tablet Take 400 mcg by mouth Daily.     • hydrALAZINE (APRESOLINE) 25 MG tablet Every 12 (Twelve) Hours.     • HYDROcodone-acetaminophen (NORCO)  MG per tablet Take 1 tablet by mouth 4 (Four) Times a Day.  0   • hydroxychloroquine (PLAQUENIL) 200 MG tablet TAKE ONE TABLET BY MOUTH EVERY DAY with food OR MILK  0   • hydrOXYzine (ATARAX) 25 MG tablet Take 25 mg by mouth Every 6 (Six) Hours As Needed. for anxiety  1   • indomethacin (INDOCIN) 50 MG capsule Take 50 mg by mouth As Needed (MIgraine).     • lamoTRIgine (LaMICtal) 25 MG tablet Take 25 mg by mouth Daily.     • levothyroxine (SYNTHROID, LEVOTHROID) 100 MCG tablet Take 100 mcg by mouth Daily.  0   • losartan (COZAAR) 25 MG tablet Take 25 mg by mouth Daily.  0   • Magnesium 100 MG capsule Take 165 mg by mouth Daily.     • meclizine (ANTIVERT) 25 MG tablet Take 25 mg by mouth 3 (Three) Times a Day As Needed  "for dizziness.     • memantine (NAMENDA) 10 MG tablet Take 10 mg by mouth 2 (Two) Times a Day.  3   • metoclopramide (REGLAN) 10 MG tablet Take 1 tablet by mouth three times a day before meals for 30 days     • metroNIDAZOLE (FLAGYL) 500 MG tablet metronidazole 500 mg tablet     • mirtazapine (REMERON) 15 MG tablet Take 15 mg by mouth every night at bedtime.     • ondansetron (ZOFRAN) 4 MG tablet ondansetron HCl 4 mg tablet     • pantoprazole (PROTONIX) 40 MG EC tablet Take 40 mg by mouth Daily.  0   • potassium chloride (K-DUR) 10 MEQ CR tablet 1 TABLET WITH FOOD ONCE A DAY PRN ORALLY 15     • predniSONE (DELTASONE) 20 MG tablet TAKE ONE TABLET BY MOUTH EVERY DAY FOR 10 DAYS  0   • risperiDONE (risperDAL) 0.5 MG tablet Take 0.05 mg by mouth Daily.  0   • sorbitol 70 % solution solution TAKE 50ML TWO TIMES AS DIRECTED PER WRITTEN INSTRUCTIONS  0   • temazepam (RESTORIL) 15 MG capsule Take 15 mg by mouth At Night As Needed.     • tobramycin-dexamethasone (TOBRADEX) 0.3-0.1 % ophthalmic suspension tobramycin 0.3 %-dexamethasone 0.1 % eye drops,suspension     • vitamin B-6 (PYRIDOXINE) 50 MG tablet Take 100 mg by mouth Daily.       No current facility-administered medications for this visit.            PHYSICAL EXAM:    Current Vitals:  /63   Pulse 69   Temp 96.9 °F (36.1 °C)   Ht 152.4 cm (60\")   Wt 90.3 kg (199 lb)   BMI 38.86 kg/m²       VITAL signs in the last 24 hours: [unfilled]       10/29/20  0917   Weight: 90.3 kg (199 lb)       Physical Exam     ECOG PERFORMANCE STATUS:3  Physical Exam:  General: No acute distress  Eyes: Sclera anicteric, EOMS-I  ENT: no mucositis  Neck: Supple, with full ROM  Lymph nodes: No cervical, lymphadenopathy  Pulmonary: no wheezes or stridor .  CV: Regular rate and rhythm.  GI: Soft, non-tender,   Vascular: 2+ edema  Neurologic: Cranial nerves 2-12 grossly intact, no focal motor deficits  Psych/MS: Alert and oriented x3,    LABORATORY/DATA:  WBC   Date Value Ref Range " Status   06/11/2020 5.9 3.4 - 10.8 x10E3/uL Final     RBC   Date Value Ref Range Status   06/11/2020 4.05 3.77 - 5.28 x10E6/uL Final     Hemoglobin   Date Value Ref Range Status   06/11/2020 12.2 11.1 - 15.9 g/dL Final   12/12/2019 11.2 (L) 12.0 - 15.9 g/dL Final     Hematocrit   Date Value Ref Range Status   06/11/2020 37.5 34.0 - 46.6 % Final   12/12/2019 34.1 34.0 - 46.6 % Final     MCV   Date Value Ref Range Status   06/11/2020 93 79 - 97 fL Final   12/12/2019 84.1 79.0 - 97.0 fL Final     MCH   Date Value Ref Range Status   06/11/2020 30.1 26.6 - 33.0 pg Final   12/12/2019 27.5 26.6 - 33.0 pg Final     MCHC   Date Value Ref Range Status   06/11/2020 32.5 31.5 - 35.7 g/dL Final   12/12/2019 32.7 31.5 - 35.7 g/dL Final     RDW   Date Value Ref Range Status   06/11/2020 12.9 11.7 - 15.4 % Final   12/12/2019 15.4 12.3 - 15.4 % Final     RDW-SD   Date Value Ref Range Status   12/12/2019 45.9 37.0 - 54.0 fl Final     MPV   Date Value Ref Range Status   12/12/2019 9.1 6.0 - 12.0 fL Final     Platelets   Date Value Ref Range Status   06/11/2020 232 150 - 450 x10E3/uL Final   12/12/2019 249 140 - 450 10*3/mm3 Final     Neutrophil Rel %   Date Value Ref Range Status   06/11/2020 46 Not Estab. % Final     Neutrophil %   Date Value Ref Range Status   12/12/2019 37.7 (L) 42.7 - 76.0 % Final     Lymphocyte Rel %   Date Value Ref Range Status   06/11/2020 39 Not Estab. % Final     Lymphocyte %   Date Value Ref Range Status   12/12/2019 46.4 (H) 19.6 - 45.3 % Final     Monocyte Rel %   Date Value Ref Range Status   06/11/2020 9 Not Estab. % Final     Monocyte %   Date Value Ref Range Status   12/12/2019 9.5 5.0 - 12.0 % Final     Eosinophil Rel %   Date Value Ref Range Status   06/11/2020 4 Not Estab. % Final     Eosinophil %   Date Value Ref Range Status   12/12/2019 4.2 0.3 - 6.2 % Final     Basophil Rel %   Date Value Ref Range Status   06/11/2020 2 Not Estab. % Final     Basophil %   Date Value Ref Range Status    12/12/2019 2.2 (H) 0.0 - 1.5 % Final     Neutrophils Absolute   Date Value Ref Range Status   06/11/2020 2.7 1.4 - 7.0 x10E3/uL Final     Neutrophils, Absolute   Date Value Ref Range Status   12/12/2019 2.00 1.70 - 7.00 10*3/mm3 Final     Lymphocytes Absolute   Date Value Ref Range Status   06/11/2020 2.3 0.7 - 3.1 x10E3/uL Final     Lymphocytes, Absolute   Date Value Ref Range Status   12/12/2019 2.50 0.70 - 3.10 10*3/mm3 Final     Monocytes Absolute   Date Value Ref Range Status   06/11/2020 0.5 0.1 - 0.9 x10E3/uL Final     Monocytes, Absolute   Date Value Ref Range Status   12/12/2019 0.50 0.10 - 0.90 10*3/mm3 Final     Eosinophils Absolute   Date Value Ref Range Status   06/11/2020 0.2 0.0 - 0.4 x10E3/uL Final     Eosinophils, Absolute   Date Value Ref Range Status   12/12/2019 0.20 0.00 - 0.40 10*3/mm3 Final     Basophils Absolute   Date Value Ref Range Status   06/11/2020 0.1 0.0 - 0.2 x10E3/uL Final     Basophils, Absolute   Date Value Ref Range Status   12/12/2019 0.10 0.00 - 0.20 10*3/mm3 Final     nRBC   Date Value Ref Range Status   12/12/2019 0.0 0.0 - 0.2 /100 WBC Final     Lab Results   Component Value Date    GLUCOSE 186 (H) 10/31/2019    BUN 18 06/11/2020    CREATININE 0.83 06/11/2020    EGFRIFNONA 68 06/11/2020    EGFRIFAFRI 78 06/11/2020    BCR 22 06/11/2020    K 4.9 06/11/2020    CO2 27 06/11/2020    CALCIUM 10.0 06/11/2020    PROTENTOTREF 6.2 06/11/2020    ALBUMIN 4.2 06/11/2020    LABIL2 2.1 06/11/2020    AST 25 06/11/2020    ALT 18 06/11/2020     No results found for:  No results found for: SPEP, UPEP No results found for: LDH, URICACID   Lab Results   Component Value Date    IRON 47 10/31/2019    TIBC 283 06/25/2020    FERRITIN 373 (H) 06/25/2020          IMAGING:  No results found.    PROBLEM LIST:  Patient Active Problem List   Diagnosis   • Malignant neoplasm of transverse colon (CMS/HCC)   • Degeneration of intervertebral disc   • Depression   • Family history of malignant neoplasm  of colon   • Family history of diabetes mellitus   • Gastroesophageal reflux disease   • Hypothyroidism   • Low back pain   • Osteoporosis   • Iron deficiency anemia secondary to inadequate dietary iron intake       Assessment & Plan  80 y.o. F w/ stage 2A colon cancer.      1) colon cancer:  Will follow-up  To see if CEA was indeed drawn.   Otherwise, we will draw it today.  Her colonoscopy, based on her f/u, quite likely revealed a very healthy colon.  Will have her return in 6 months with repeat CEA. CT in June was [place]. No need for another CT prior to her next f/u.    2) h/o HENRRY: no further  HENRRY since her surgery last year.    3) Fatigue: mutltifactorial including her polypharmarcy.            Gigi Schaffer MD,  10/29/2020   09:42 EDT

## 2021-04-28 ENCOUNTER — OFFICE (AMBULATORY)
Dept: URBAN - METROPOLITAN AREA CLINIC 64 | Facility: CLINIC | Age: 81
End: 2021-04-28

## 2021-04-28 VITALS — HEIGHT: 60 IN

## 2021-04-28 DIAGNOSIS — K21.9 GASTRO-ESOPHAGEAL REFLUX DISEASE WITHOUT ESOPHAGITIS: ICD-10-CM

## 2021-04-28 DIAGNOSIS — C18.9 MALIGNANT NEOPLASM OF COLON, UNSPECIFIED: ICD-10-CM

## 2021-04-28 DIAGNOSIS — R14.0 ABDOMINAL DISTENSION (GASEOUS): ICD-10-CM

## 2021-04-28 DIAGNOSIS — R10.84 GENERALIZED ABDOMINAL PAIN: ICD-10-CM

## 2021-04-28 PROCEDURE — 99214 OFFICE O/P EST MOD 30 MIN: CPT | Mod: 95 | Performed by: INTERNAL MEDICINE

## 2021-06-10 PROBLEM — G61.9 INFLAMMATORY AND TOXIC NEUROPATHY (HCC): Status: ACTIVE | Noted: 2021-06-10

## 2021-06-10 PROBLEM — I10 HIGH BLOOD PRESSURE: Status: ACTIVE | Noted: 2021-06-10

## 2021-06-10 PROBLEM — E83.51 HYPOCALCEMIA: Status: ACTIVE | Noted: 2021-06-10

## 2021-06-10 PROBLEM — K31.2 HOURGLASS STRICTURE AND STENOSIS OF STOMACH: Status: ACTIVE | Noted: 2019-08-14

## 2021-06-10 PROBLEM — K92.2 GASTROINTESTINAL HEMORRHAGE, UNSPECIFIED: Status: ACTIVE | Noted: 2019-08-14

## 2021-06-10 PROBLEM — E78.2 MIXED HYPERLIPIDEMIA: Status: ACTIVE | Noted: 2021-06-10

## 2021-06-10 PROBLEM — N39.41 URGE INCONTINENCE OF URINE: Status: ACTIVE | Noted: 2021-06-10

## 2021-06-10 PROBLEM — I27.0 PRIMARY PULMONARY HYPERTENSION (HCC): Status: ACTIVE | Noted: 2021-06-10

## 2021-06-10 PROBLEM — E11.9 TYPE 2 DIABETES MELLITUS WITHOUT COMPLICATION: Status: ACTIVE | Noted: 2021-06-10

## 2021-06-10 PROBLEM — E11.9 TYPE 2 OR UNSPECIFIED TYPE DIABETES MELLITUS: Status: ACTIVE | Noted: 2021-06-10

## 2021-06-10 PROBLEM — N95.2 ATROPHIC VAGINITIS: Status: ACTIVE | Noted: 2021-06-10

## 2021-06-10 PROBLEM — J30.81 ALLERGIC RHINITIS DUE TO ANIMAL HAIR AND DANDER: Status: ACTIVE | Noted: 2021-06-10

## 2021-06-10 PROBLEM — E55.9 VITAMIN D DEFICIENCY: Status: ACTIVE | Noted: 2021-06-10

## 2021-06-10 PROBLEM — G47.30 SLEEP APNEA: Status: ACTIVE | Noted: 2021-06-10

## 2021-06-10 PROBLEM — I10 BENIGN ESSENTIAL HYPERTENSION: Status: ACTIVE | Noted: 2021-06-10

## 2021-06-10 PROBLEM — R26.81 UNSTEADY GAIT: Status: ACTIVE | Noted: 2021-06-10

## 2021-06-10 PROBLEM — R13.10 DYSPHAGIA: Status: ACTIVE | Noted: 2019-08-14

## 2021-06-10 PROBLEM — R41.3 MEMORY LOSS: Status: ACTIVE | Noted: 2021-06-10

## 2021-06-10 PROBLEM — Z98.890 OTHER SPECIFIED POSTPROCEDURAL STATE: Status: ACTIVE | Noted: 2021-06-10

## 2021-06-10 PROBLEM — M06.9 RHEUMATOID ARTHRITIS (HCC): Status: ACTIVE | Noted: 2019-09-11

## 2021-06-10 PROBLEM — Z91.81 HISTORY OF FALL: Status: ACTIVE | Noted: 2021-06-10

## 2021-06-10 PROBLEM — K57.92 DIVERTICULITIS: Status: ACTIVE | Noted: 2021-06-10

## 2021-06-10 PROBLEM — M79.7 FIBROMYALGIA: Status: ACTIVE | Noted: 2019-09-11

## 2021-06-10 PROBLEM — F03.90 DEMENTIA (HCC): Status: ACTIVE | Noted: 2021-06-10

## 2021-06-10 PROBLEM — M17.9 OSTEOARTHRITIS OF KNEE: Status: ACTIVE | Noted: 2019-09-11

## 2021-06-10 PROBLEM — J30.9 ALLERGIC RHINITIS: Status: ACTIVE | Noted: 2021-06-10

## 2021-06-10 PROBLEM — M54.12 CERVICAL RADICULOPATHY: Status: ACTIVE | Noted: 2021-06-10

## 2021-06-10 PROBLEM — K59.01 SLOW TRANSIT CONSTIPATION: Status: ACTIVE | Noted: 2021-06-10

## 2021-06-10 PROBLEM — E21.3 HYPERPARATHYROIDISM (HCC): Status: ACTIVE | Noted: 2021-06-10

## 2021-06-10 PROBLEM — M51.37 DEGENERATION OF LUMBOSACRAL INTERVERTEBRAL DISC: Status: ACTIVE | Noted: 2021-06-10

## 2021-06-10 PROBLEM — K58.0 IRRITABLE BOWEL SYNDROME WITH DIARRHEA: Status: ACTIVE | Noted: 2021-06-10

## 2021-06-10 PROBLEM — G62.2 INFLAMMATORY AND TOXIC NEUROPATHY (HCC): Status: ACTIVE | Noted: 2021-06-10

## 2021-06-10 PROBLEM — Z85.038 HISTORY OF MALIGNANT NEOPLASM OF COLON: Status: ACTIVE | Noted: 2021-06-10

## 2021-06-10 PROBLEM — K22.2 STRICTURE AND STENOSIS OF ESOPHAGUS: Status: ACTIVE | Noted: 2019-08-14

## 2021-06-10 PROBLEM — E66.9 CLASS 2 OBESITY: Status: ACTIVE | Noted: 2021-06-10

## 2021-06-10 PROBLEM — H81.399 PERIPHERAL VERTIGO: Status: ACTIVE | Noted: 2021-06-10

## 2021-06-10 PROBLEM — D64.9 ANEMIA: Status: ACTIVE | Noted: 2021-06-10

## 2021-06-10 PROBLEM — G47.34 IDIOPATHIC SLEEP RELATED NONOBSTRUCTIVE ALVEOLAR HYPOVENTILATION: Status: ACTIVE | Noted: 2021-06-10

## 2021-06-10 PROBLEM — K56.600 PARTIAL INTESTINAL OBSTRUCTION, UNSPECIFIED AS TO CAUSE: Status: ACTIVE | Noted: 2019-08-14

## 2021-06-10 PROBLEM — Z86.010 HISTORY OF COLONIC POLYPS: Status: ACTIVE | Noted: 2019-08-14

## 2021-06-10 PROBLEM — K63.5 COLON POLYPS: Status: ACTIVE | Noted: 2021-06-10

## 2021-06-10 PROBLEM — J98.01 ACUTE BRONCHOSPASM: Status: ACTIVE | Noted: 2021-06-10

## 2021-06-10 PROBLEM — R05.8 ALLERGIC COUGH: Status: ACTIVE | Noted: 2021-06-10

## 2021-06-10 PROBLEM — M06.4 UNDIFFERENTIATED INFLAMMATORY POLYARTHRITIS (HCC): Status: ACTIVE | Noted: 2021-06-10

## 2021-06-10 PROBLEM — I50.31 ACUTE DIASTOLIC HEART FAILURE: Status: ACTIVE | Noted: 2021-06-10

## 2021-06-10 PROBLEM — Q89.9: Status: ACTIVE | Noted: 2021-06-10

## 2021-06-10 RX ORDER — LIDOCAINE 36 MG/1
PATCH TOPICAL
COMMUNITY
End: 2021-06-16

## 2021-06-10 RX ORDER — PREDNISONE 10 MG/1
TABLET ORAL
COMMUNITY

## 2021-06-10 RX ORDER — MONTELUKAST SODIUM 10 MG/1
TABLET ORAL EVERY 24 HOURS
COMMUNITY

## 2021-06-16 ENCOUNTER — OFFICE VISIT (OUTPATIENT)
Dept: NEUROLOGY | Facility: CLINIC | Age: 81
End: 2021-06-16

## 2021-06-16 VITALS
DIASTOLIC BLOOD PRESSURE: 91 MMHG | TEMPERATURE: 97.7 F | HEART RATE: 70 BPM | HEIGHT: 60 IN | BODY MASS INDEX: 38.68 KG/M2 | SYSTOLIC BLOOD PRESSURE: 174 MMHG | WEIGHT: 197 LBS

## 2021-06-16 DIAGNOSIS — R41.3 MEMORY LOSS: Primary | ICD-10-CM

## 2021-06-16 DIAGNOSIS — G44.89 OTHER HEADACHE SYNDROME: ICD-10-CM

## 2021-06-16 PROCEDURE — 99204 OFFICE O/P NEW MOD 45 MIN: CPT | Performed by: PSYCHIATRY & NEUROLOGY

## 2021-06-16 RX ORDER — MEMANTINE HYDROCHLORIDE 10 MG/1
10 TABLET ORAL DAILY
Refills: 3
Start: 2021-06-16

## 2021-06-16 RX ORDER — DOXYCYCLINE HYCLATE 100 MG
TABLET ORAL
COMMUNITY
Start: 2021-06-09

## 2021-06-16 RX ORDER — RIVASTIGMINE 4.6 MG/24H
1 PATCH, EXTENDED RELEASE TRANSDERMAL DAILY
Qty: 30 PATCH | Refills: 11 | Status: SHIPPED | OUTPATIENT
Start: 2021-06-16

## 2021-06-25 ENCOUNTER — TELEPHONE (OUTPATIENT)
Dept: NEUROLOGY | Facility: CLINIC | Age: 81
End: 2021-06-25

## 2021-06-25 NOTE — TELEPHONE ENCOUNTER
Provider: DR.SEIPEL    Caller: CORTNEY    Relationship to Patient: DAUGHTER    Pharmacy: NA    Phone Number: [phone]  Reason for Call: CORTNEY STATES THAT ANT ALSO HAS MRI SCHEDULED FOR [facility] IN [place]. AND DOES NOT WANT TO KEEP MRI AT [place]. I GAVE THEM RADIOLOGY NUMBER TO CANCEL ONE AT [facility] IF THEY ARE DOING MRI AT [facility]. PLEASE ADVISE.     When was the patient last seen: 6-16-21

## 2021-06-30 ENCOUNTER — OFFICE (AMBULATORY)
Dept: URBAN - METROPOLITAN AREA CLINIC 64 | Facility: CLINIC | Age: 81
End: 2021-06-30

## 2021-06-30 VITALS
HEIGHT: 60 IN | SYSTOLIC BLOOD PRESSURE: 118 MMHG | WEIGHT: 198 LBS | HEART RATE: 84 BPM | DIASTOLIC BLOOD PRESSURE: 80 MMHG

## 2021-06-30 DIAGNOSIS — K21.9 GASTRO-ESOPHAGEAL REFLUX DISEASE WITHOUT ESOPHAGITIS: ICD-10-CM

## 2021-06-30 DIAGNOSIS — K31.84 GASTROPARESIS: ICD-10-CM

## 2021-06-30 DIAGNOSIS — R14.0 ABDOMINAL DISTENSION (GASEOUS): ICD-10-CM

## 2021-06-30 DIAGNOSIS — R11.2 NAUSEA WITH VOMITING, UNSPECIFIED: ICD-10-CM

## 2021-06-30 DIAGNOSIS — K59.00 CONSTIPATION, UNSPECIFIED: ICD-10-CM

## 2021-06-30 DIAGNOSIS — K63.5 POLYP OF COLON: ICD-10-CM

## 2021-06-30 PROCEDURE — 99214 OFFICE O/P EST MOD 30 MIN: CPT | Performed by: NURSE PRACTITIONER

## 2021-06-30 RX ORDER — SIMETHICONE 250 MG/1
1000 CAPSULE, GELATIN COATED ORAL
Qty: 99 | Refills: 9 | Status: COMPLETED
Start: 2021-06-30 | End: 2023-02-23

## 2021-07-05 ENCOUNTER — APPOINTMENT (OUTPATIENT)
Dept: MRI IMAGING | Facility: HOSPITAL | Age: 81
End: 2021-07-05

## 2021-07-28 NOTE — TELEPHONE ENCOUNTER
Provider: DR.SEIPEL     Caller: Violeta Doty      Relationship to Patient: PT'S DAUGHTER  Phone Number: [phone]      Reason for Call: VIOLETA STATES THAT THE ORDER FOR THE MRI BRAIN WITH & WITHOUT CONTRAST NEEDS TO BE SENT TO [facility] IN [place].SHE STATES THAT YOU CAN CALL [facility]([place]) HOSP PHONE NUMBER [phone] TO GET THE FAX # . SHE DOES NOT HAVE THE FAX# ON WHERE TO FAX IT TO.

## 2021-08-11 ENCOUNTER — TELEPHONE (OUTPATIENT)
Dept: NEUROLOGY | Facility: CLINIC | Age: 81
End: 2021-08-11

## 2021-08-11 NOTE — TELEPHONE ENCOUNTER
Provider: DR. SEIPEL    Caller: KHURRAM    Relationship to Patient: DAUGHTER    Phone Number: [phone]    Reason for Call: SAYS [facility] NEVER RECEIVED ORDER FOR MRI. PLEASE REFAX [phone]. LET HER KNOW THEY WOULD CALL HER TO SCHEDULE AND TO CALL THEM AGAIN IN A COUPLE DAYS.

## 2021-11-18 ENCOUNTER — OFFICE (AMBULATORY)
Dept: URBAN - METROPOLITAN AREA CLINIC 64 | Facility: CLINIC | Age: 81
End: 2021-11-18

## 2021-11-18 VITALS
SYSTOLIC BLOOD PRESSURE: 158 MMHG | WEIGHT: 195 LBS | DIASTOLIC BLOOD PRESSURE: 94 MMHG | HEIGHT: 60 IN | HEART RATE: 80 BPM

## 2021-11-18 DIAGNOSIS — R14.0 ABDOMINAL DISTENSION (GASEOUS): ICD-10-CM

## 2021-11-18 DIAGNOSIS — K21.9 GASTRO-ESOPHAGEAL REFLUX DISEASE WITHOUT ESOPHAGITIS: ICD-10-CM

## 2021-11-18 PROCEDURE — 99213 OFFICE O/P EST LOW 20 MIN: CPT | Performed by: NURSE PRACTITIONER

## 2022-11-10 ENCOUNTER — OFFICE (AMBULATORY)
Dept: URBAN - METROPOLITAN AREA CLINIC 64 | Facility: CLINIC | Age: 82
End: 2022-11-10

## 2022-11-10 VITALS
HEART RATE: 101 BPM | SYSTOLIC BLOOD PRESSURE: 148 MMHG | HEIGHT: 60 IN | DIASTOLIC BLOOD PRESSURE: 96 MMHG | WEIGHT: 182 LBS

## 2022-11-10 DIAGNOSIS — K21.9 GASTRO-ESOPHAGEAL REFLUX DISEASE WITHOUT ESOPHAGITIS: ICD-10-CM

## 2022-11-10 DIAGNOSIS — K59.00 CONSTIPATION, UNSPECIFIED: ICD-10-CM

## 2022-11-10 PROCEDURE — 99214 OFFICE O/P EST MOD 30 MIN: CPT | Performed by: NURSE PRACTITIONER

## 2022-11-10 RX ORDER — METOCLOPRAMIDE 10 MG/1
10 TABLET ORAL
Qty: 90 | Refills: 3 | Status: ACTIVE

## 2023-02-23 ENCOUNTER — TRANSCRIBE ORDERS (OUTPATIENT)
Dept: ADMINISTRATIVE | Facility: HOSPITAL | Age: 83
End: 2023-02-23
Payer: MEDICARE

## 2023-02-23 ENCOUNTER — OFFICE (AMBULATORY)
Dept: URBAN - METROPOLITAN AREA CLINIC 64 | Facility: CLINIC | Age: 83
End: 2023-02-23

## 2023-02-23 VITALS
HEIGHT: 60 IN | HEART RATE: 66 BPM | WEIGHT: 183 LBS | SYSTOLIC BLOOD PRESSURE: 150 MMHG | DIASTOLIC BLOOD PRESSURE: 88 MMHG

## 2023-02-23 DIAGNOSIS — Z86.010 PERSONAL HISTORY OF COLONIC POLYPS: ICD-10-CM

## 2023-02-23 DIAGNOSIS — R11.2 NAUSEA AND VOMITING, UNSPECIFIED VOMITING TYPE: ICD-10-CM

## 2023-02-23 DIAGNOSIS — Z79.02 LONG TERM (CURRENT) USE OF ANTITHROMBOTICS/ANTIPLATELETS: ICD-10-CM

## 2023-02-23 DIAGNOSIS — K59.00 CONSTIPATION, UNSPECIFIED: ICD-10-CM

## 2023-02-23 DIAGNOSIS — R13.10 DYSPHAGIA, UNSPECIFIED: ICD-10-CM

## 2023-02-23 DIAGNOSIS — R11.2 NAUSEA WITH VOMITING, UNSPECIFIED: ICD-10-CM

## 2023-02-23 DIAGNOSIS — K59.00 CONSTIPATION, UNSPECIFIED CONSTIPATION TYPE: ICD-10-CM

## 2023-02-23 DIAGNOSIS — K21.9 GASTROESOPHAGEAL REFLUX DISEASE, UNSPECIFIED WHETHER ESOPHAGITIS PRESENT: Primary | ICD-10-CM

## 2023-02-23 DIAGNOSIS — K21.9 GASTRO-ESOPHAGEAL REFLUX DISEASE WITHOUT ESOPHAGITIS: ICD-10-CM

## 2023-02-23 DIAGNOSIS — Z79.02 ENCOUNTER FOR CURRENT LONG TERM USE OF ANTIPLATELET DRUG: ICD-10-CM

## 2023-02-23 PROCEDURE — 99214 OFFICE O/P EST MOD 30 MIN: CPT | Performed by: NURSE PRACTITIONER

## 2023-03-06 ENCOUNTER — HOSPITAL ENCOUNTER (OUTPATIENT)
Dept: GENERAL RADIOLOGY | Facility: HOSPITAL | Age: 83
Discharge: HOME | End: 2023-03-06
Admitting: NURSE PRACTITIONER
Payer: MEDICARE

## 2023-03-06 DIAGNOSIS — Z79.02 ENCOUNTER FOR CURRENT LONG TERM USE OF ANTIPLATELET DRUG: ICD-10-CM

## 2023-03-06 DIAGNOSIS — K21.9 GASTROESOPHAGEAL REFLUX DISEASE, UNSPECIFIED WHETHER ESOPHAGITIS PRESENT: ICD-10-CM

## 2023-03-06 DIAGNOSIS — K59.00 CONSTIPATION, UNSPECIFIED CONSTIPATION TYPE: ICD-10-CM

## 2023-03-06 DIAGNOSIS — Z86.010 PERSONAL HISTORY OF COLONIC POLYPS: ICD-10-CM

## 2023-03-06 DIAGNOSIS — R11.2 NAUSEA AND VOMITING, UNSPECIFIED VOMITING TYPE: ICD-10-CM

## 2023-03-06 PROCEDURE — A9270 NON-COVERED ITEM OR SERVICE: HCPCS | Performed by: NURSE PRACTITIONER

## 2023-03-06 PROCEDURE — 63710000001 BARIUM SULFATE 700 MG TABLET: Performed by: NURSE PRACTITIONER

## 2023-03-06 PROCEDURE — 92611 MOTION FLUOROSCOPY/SWALLOW: CPT

## 2023-03-06 PROCEDURE — 63710000001 BARIUM SULFATE 40 % SUSPENSION: Performed by: NURSE PRACTITIONER

## 2023-03-06 PROCEDURE — 74230 X-RAY XM SWLNG FUNCJ C+: CPT

## 2023-03-06 RX ADMIN — BARIUM SULFATE 700 MG: 700 TABLET ORAL at 08:25

## 2023-03-06 RX ADMIN — BARIUM SULFATE 50 ML: 400 SUSPENSION ORAL at 08:25

## 2023-03-07 ENCOUNTER — HOSPITAL ENCOUNTER (OUTPATIENT)
Dept: SPEECH THERAPY | Facility: HOSPITAL | Age: 83
Setting detail: THERAPIES SERIES
Discharge: HOME | End: 2023-03-07
Payer: MEDICARE

## 2023-03-07 NOTE — MBS/VFSS/FEES
Outpatient Speech Language Pathology   Adult Swallow Initial Evaluation   [place]     Patient Name: Willa Sierra  : 1940  MRN: [id number]  Today's Date: 3/7/2023         Visit Date: 2023   Patient Active Problem List   Diagnosis   • Malignant neoplasm of transverse colon (HCC)   • Degeneration of intervertebral disc   • Depression   • Family history of malignant neoplasm of colon   • Family history of diabetes mellitus   • Gastroesophageal reflux disease   • Hypothyroidism   • Low back pain   • Osteoporosis   • Chronic fatigue   • Morbidly obese (HCC)   • Vitamin D deficiency   • Urge incontinence of urine   • Unsteady gait   • Undifferentiated inflammatory polyarthritis (HCC)   • Type 2 or unspecified type diabetes mellitus   • Type 2 diabetes mellitus without complication (HCC)   • Stricture and stenosis of esophagus   • Sleep apnea   • Rheumatoid arthritis (HCC)   • Primary pulmonary hypertension (HCC)   • Peripheral vertigo   • Partial intestinal obstruction, unspecified as to cause (HCC)   • Other specified postprocedural state   • Osteoarthritis of knee   • Memory loss   • Inflammatory and toxic neuropathy (HCC)   • Idiopathic sleep related nonobstructive alveolar hypoventilation   • Hypocalcemia   • Hyperparathyroidism (HCC)   • Hourglass stricture and stenosis of stomach   • History of fall   • History of colonic polyps   • History of malignant neoplasm of colon   • Mixed hyperlipidemia   • High blood pressure   • Gastrointestinal hemorrhage, unspecified   • Fibromyalgia   • Encounter for screening for malignant neoplasm of colon   • Dysphagia   • Diverticulitis   • Irritable bowel syndrome with diarrhea   • Slow transit constipation   • Dementia (HCC)   • Degeneration of lumbosacral intervertebral disc   • Congenital malformation syndrome   • Colon polyps   • Class 2 obesity   • Cervical radiculopathy   • Benign essential hypertension   • Class 2 obesity   • Atrophic vaginitis   • Anemia   •  Allergic rhinitis due to animal hair and dander   • Allergic rhinitis   • Allergic cough   • Aftercare following other surgery of teeth, oral cavity, and digestive system   • Acute diastolic heart failure (HCC)   • Acute bronchospasm        Past Medical History:   Diagnosis Date   • Anemia    • Arthritis    • Cataract    • Depression    • Osteoporosis    • Thyroid disease    • TIA (transient ischemic attack)         Past Surgical History:   Procedure Laterality Date   • CHOLECYSTECTOMY     • COLON RESECTION     • FACIAL COSMETIC SURGERY     • GASTRIC BYPASS     • HEMORRHOIDECTOMY     • HYSTERECTOMY           Visit Dx:   GERD 530.81-K21.9  Dysphagia 787.20-R13.10           SLP Adult Swallow Evaluation     Row Name 03/07/23 0800       Rehab Evaluation    Document Type evaluation  -SM    Subjective Information no complaints  -SM    Patient Observations alert;cooperative;agree to therapy  -SM    Patient Effort excellent  -SM    Comment Video Swallow Study completed this date. Patient was wearing a face mask during this therapy encounter. The patient's mask was removed to allow po trials to be administered for purposes of this assessment. The patient's mask was replaced prior to being transported back up to their room. Therapist used appropriate personal protective equipment including mask, eye protection and gloves.  Mask used was standard procedure mask. Appropriate PPE was worn during the entire therapy session. Hand hygiene was completed before and after therapy session. Patient is not in enhanced droplet precautions.       -SM    Symptoms Noted During/After Treatment none  -SM       General Information    Patient Profile Reviewed yes  -SM    Pertinent History Of Current Problem Willa Sierra in an 82 year old female who presents today for a video swallow study due to reports of difficulty swallowing. She served as her own informant and appears to be a reliable historian. She reports difficulty swallowing, stating  "\"every once in a while I get choked on water\" and \"things get stuck in my neck and I have to swallow, swallow to get it down\". She describes a globus sensation and points to the pyriform sinus region. She reports an onset of approximately 2-3 years in duration. She denies a history of pneumonia. She does report approximately an 11 - 12 pound intentional weight loss in the past year. She is referred by her GI physician for an instrumental assessment of her swallow due to the above mentioned complaints.    -SM    Current Method of Nutrition regular textures;thin liquids  -SM    Precautions/Limitations, Vision WFL;for purposes of eval  -SM    Precautions/Limitations, Hearing WFL;for purposes of eval  -SM    Prior Level of Function-Communication WFL;other (see comments)  She served as her own informant  -    Prior Level of Function-Swallowing regular textures;thin liquids  -SM    Plans/Goals Discussed with patient and family  -       Oral Motor Structure and Function    Dentition Assessment upper dentures/partial in place;lower dentures/partial in place  Patient states dentures are loose  -SM    Secretion Management WNL/WFL  -SM    Mucosal Quality moist, healthy  -SM       Oral Musculature and Cranial Nerve Assessment    Oral Motor General Assessment WFL  -SM    Oral Motor, Comment Lingual protrusion, lateralization and elevation all WFL. Labial protrusion/retraction WFL bilaterally. No overt weakness or asymmetry appreciated. She has full upper and lower dentures in place. She does report dentures are loose however no slipping noted during this assessment. The patient is verbal and presents with excellent intelligibility. No hoarse/wet vocal quality audible at any time.  -SM       General Eating/Swallowing Observations    Respiratory Support Currently in Use room air  -    Eating/Swallowing Skills self-fed  -SM    Positioning During Eating upright 90 degree;upright in chair  -       MBS/VFSS    Utensils Used " cup;spoon  -SM    Consistencies Trialed thin liquids;pureed;soft to chew textures;regular textures;barium pill  -SM       MBS/VFSS Interpretation    VFSS Summary Video Fluoroscopic Swallow Study conducted in the lateral projection with the patient seated upright at a 90 degree angle. The study was conducted in conjunction with Radiologist Dr. Brito. The patient was able to self feed all trials as provided by SLP respectively to include the following: thin liquid by cup x 3, puree (applesauce) x 2, mechanical soft/mixed consistency (peaches) x 2, regular solid (cracker) x 1, BA tablet with water followed by an esophageal scan.  Bolus formation, cohesion, & transit are WFL for all trials.Swallow initiation is timely with no delay appreciated. Upon initiation of the swallow she presents with functional laryngeal elevation, complete epiglottic deflection, and adequate anterior hyoid excursion. No evidence of penetration or aspiration noted at any time. She clears the oral and pharyngeal cavities well with no significant residue. The barium tablet is noted to clear hypopharynx following 2 drinks of water & subsequently clears into stomach as confirmed by radiologist upon esophageal scan. An esophageal scan was completed following puree (applesauce) consistency and revealed significant esophageal distention, esophageal retention, and retrograde flow below the level of the UES. (See image below). Much belching noted following the procedure, which the patient reports is common for her.         “When evaluating the esophageal domain, it is important to understand that neither the imaging protocol nor purpose of the MBSS is sufficient for complete assessment of esophageal function; rather, MBSS focuses on the degree and timing of esophageal bolus clearance (in the upright position) both of which influence oral and pharyngeal function” (TIM Lopez, KATIE Manrique., KISHA Dixon., ELA Wallace, YEFRI Cartwright, Salud,  M.A. (2020). Best practices in Modified Barium Swallow Studies, American Journal of Speech-Language Pathology, 29(2S),8528-3289. https://[email or web address]).     PENETRATION/ASPIRATION SCALE: A 1 for all consistencies assessed (Material does not enter the airway).  (ARLET Watson et al. A Penetration-Aspiration Scale. Dysphagia. 1996; 11(2):93-8.)    IMPRESSIONS: The patient presents with an oropharyngeal swallow which is judged to be within functional limits under fluoroscopy with all trials assessed.    RECOMMENDATIONS: It is recommended that the patient continue her current diet of regular consistencies/thin liquids at this time. It is recommended that she choose softer/moister solids, and may benefit from adding extra sauce/gravy to foods to increase moisture content and ease of swallow. She may benefit from alternating liquids with solids to aid in clearing oral, pharyngeal, and/or esophageal retention. She is encouraged to be up at a full 90 degree angle for all meals/po, alternate liquids with solids, and utilize reflux precautions as recommended. She is referred back to her GI physician for further work up of her complaints if they persist and her difficulties appear to be more esophageal in nature. Thank you for referring this pleasant patient to our department and allowing us to participate in her care. We are available to repeat this procedure in the future if additional difficulties arise.  -SM       Esophageal Phase    Esophageal Phase esophageal retention;esophageal retention with retrograde flow through PES;irregular barium column;see radiology report for further details  -       SLP Evaluation Clinical Impression    SLP Swallowing Diagnosis functional oral phase;functional pharyngeal phase;suspected esophageal dysphagia  -    Functional Impact risk of malnutrition  -    Rehab Potential/Prognosis, Swallowing re-evaluate goals as necessary  -       Recommendations    Therapy  Frequency (Swallow) evaluation only  -    SLP Diet Recommendation regular textures;thin liquids;other (see comments)  Softer foods if required. Add extra sauce/gravy  -    Recommended Diagnostics reassess via VFSS (MBS);other (see comments)  Repeat instrumental assessment of swallow if indicated for further difficulties arise  -    Recommended Precautions and Strategies upright posture during/after eating;small bites of food and sips of liquid;alternate between small bites of food and sips of liquid;general aspiration precautions;reflux precautions  -    SLP Rec. for Method of Medication Administration meds whole;meds crushed;with thin liquids;with puree;as tolerated  -    Demonstrates Need for Referral to Another Service gastroenterology  -          User Key  (r) = Recorded By, (t) = Taken By, (c) = Cosigned By    Initials Name Provider Type    [place], SLP Speech and Language Pathologist                  Time Calculation:                     ELIAS Bhagat  3/7/2023

## 2023-08-08 ENCOUNTER — OFFICE (AMBULATORY)
Dept: URBAN - METROPOLITAN AREA PATHOLOGY 4 | Facility: PATHOLOGY | Age: 83
End: 2023-08-08
Payer: COMMERCIAL

## 2023-08-08 ENCOUNTER — OFFICE (AMBULATORY)
Dept: URBAN - METROPOLITAN AREA PATHOLOGY 4 | Facility: PATHOLOGY | Age: 83
End: 2023-08-08

## 2023-08-08 ENCOUNTER — ON CAMPUS - OUTPATIENT (AMBULATORY)
Dept: URBAN - METROPOLITAN AREA HOSPITAL 2 | Facility: HOSPITAL | Age: 83
End: 2023-08-08

## 2023-08-08 VITALS
WEIGHT: 184 LBS | DIASTOLIC BLOOD PRESSURE: 75 MMHG | SYSTOLIC BLOOD PRESSURE: 144 MMHG | HEART RATE: 75 BPM | SYSTOLIC BLOOD PRESSURE: 155 MMHG | OXYGEN SATURATION: 98 % | HEIGHT: 60 IN | HEART RATE: 70 BPM | DIASTOLIC BLOOD PRESSURE: 80 MMHG | OXYGEN SATURATION: 99 % | DIASTOLIC BLOOD PRESSURE: 83 MMHG | SYSTOLIC BLOOD PRESSURE: 180 MMHG | SYSTOLIC BLOOD PRESSURE: 189 MMHG | DIASTOLIC BLOOD PRESSURE: 76 MMHG | HEART RATE: 67 BPM | OXYGEN SATURATION: 100 % | TEMPERATURE: 97.7 F | SYSTOLIC BLOOD PRESSURE: 161 MMHG | HEART RATE: 76 BPM | SYSTOLIC BLOOD PRESSURE: 175 MMHG | SYSTOLIC BLOOD PRESSURE: 128 MMHG | DIASTOLIC BLOOD PRESSURE: 93 MMHG | DIASTOLIC BLOOD PRESSURE: 86 MMHG | HEART RATE: 77 BPM | RESPIRATION RATE: 14 BRPM | SYSTOLIC BLOOD PRESSURE: 135 MMHG | DIASTOLIC BLOOD PRESSURE: 109 MMHG | HEART RATE: 71 BPM | HEART RATE: 78 BPM | DIASTOLIC BLOOD PRESSURE: 84 MMHG | SYSTOLIC BLOOD PRESSURE: 150 MMHG | RESPIRATION RATE: 16 BRPM | HEART RATE: 72 BPM

## 2023-08-08 DIAGNOSIS — D12.3 BENIGN NEOPLASM OF TRANSVERSE COLON: ICD-10-CM

## 2023-08-08 DIAGNOSIS — K57.30 DIVERTICULOSIS OF LARGE INTESTINE WITHOUT PERFORATION OR ABS: ICD-10-CM

## 2023-08-08 DIAGNOSIS — K20.80 OTHER ESOPHAGITIS WITHOUT BLEEDING: ICD-10-CM

## 2023-08-08 DIAGNOSIS — D12.2 BENIGN NEOPLASM OF ASCENDING COLON: ICD-10-CM

## 2023-08-08 DIAGNOSIS — K21.9 GASTRO-ESOPHAGEAL REFLUX DISEASE WITHOUT ESOPHAGITIS: ICD-10-CM

## 2023-08-08 DIAGNOSIS — Z98.84 BARIATRIC SURGERY STATUS: ICD-10-CM

## 2023-08-08 DIAGNOSIS — K64.1 SECOND DEGREE HEMORRHOIDS: ICD-10-CM

## 2023-08-08 DIAGNOSIS — K92.9 DISEASE OF DIGESTIVE SYSTEM, UNSPECIFIED: ICD-10-CM

## 2023-08-08 DIAGNOSIS — Z86.010 PERSONAL HISTORY OF COLONIC POLYPS: ICD-10-CM

## 2023-08-08 PROBLEM — K20.90 ESOPHAGITIS, UNSPECIFIED WITHOUT BLEEDING: Status: ACTIVE | Noted: 2023-08-08

## 2023-08-08 PROBLEM — Z48.815 ENCOUNTER FOR SURGICAL AFTERCARE FOLLOWING SURGERY ON THE DI: Status: ACTIVE | Noted: 2023-08-08

## 2023-08-08 PROCEDURE — 45385 COLONOSCOPY W/LESION REMOVAL: CPT | Mod: PT | Performed by: INTERNAL MEDICINE

## 2023-08-08 PROCEDURE — 88305 TISSUE EXAM BY PATHOLOGIST: CPT | Mod: 26 | Performed by: INTERNAL MEDICINE

## 2023-08-08 PROCEDURE — 43245 EGD DILATE STRICTURE: CPT | Performed by: INTERNAL MEDICINE

## 2023-09-01 ENCOUNTER — OFFICE (AMBULATORY)
Dept: URBAN - METROPOLITAN AREA CLINIC 64 | Facility: CLINIC | Age: 83
End: 2023-09-01

## 2023-09-01 VITALS
HEIGHT: 60 IN | HEART RATE: 77 BPM | SYSTOLIC BLOOD PRESSURE: 158 MMHG | WEIGHT: 181 LBS | DIASTOLIC BLOOD PRESSURE: 86 MMHG

## 2023-09-01 DIAGNOSIS — K31.84 GASTROPARESIS: ICD-10-CM

## 2023-09-01 DIAGNOSIS — R53.83 OTHER FATIGUE: ICD-10-CM

## 2023-09-01 DIAGNOSIS — Z79.02 LONG TERM (CURRENT) USE OF ANTITHROMBOTICS/ANTIPLATELETS: ICD-10-CM

## 2023-09-01 DIAGNOSIS — K21.9 GASTRO-ESOPHAGEAL REFLUX DISEASE WITHOUT ESOPHAGITIS: ICD-10-CM

## 2023-09-01 DIAGNOSIS — Z86.010 PERSONAL HISTORY OF COLONIC POLYPS: ICD-10-CM

## 2023-09-01 PROCEDURE — 99214 OFFICE O/P EST MOD 30 MIN: CPT | Performed by: NURSE PRACTITIONER

## 2023-09-01 RX ORDER — METOCLOPRAMIDE 10 MG/1
10 TABLET ORAL
Qty: 90 | Refills: 3 | Status: ACTIVE